# Patient Record
Sex: MALE | Race: WHITE | NOT HISPANIC OR LATINO | Employment: OTHER | URBAN - METROPOLITAN AREA
[De-identification: names, ages, dates, MRNs, and addresses within clinical notes are randomized per-mention and may not be internally consistent; named-entity substitution may affect disease eponyms.]

---

## 2017-06-20 ENCOUNTER — HOSPITAL ENCOUNTER (OUTPATIENT)
Dept: GENERAL RADIOLOGY | Facility: HOSPITAL | Age: 62
Discharge: HOME | End: 2017-06-20
Admitting: INTERNAL MEDICINE

## 2017-06-20 ENCOUNTER — HOSPITAL ENCOUNTER (OUTPATIENT)
Dept: GENERAL RADIOLOGY | Facility: HOSPITAL | Age: 62
Discharge: HOME | End: 2017-06-20

## 2017-06-20 ENCOUNTER — TRANSCRIBE ORDERS (OUTPATIENT)
Dept: GENERAL RADIOLOGY | Facility: HOSPITAL | Age: 62
End: 2017-06-20

## 2017-06-20 DIAGNOSIS — M54.9 BACK PAIN, UNSPECIFIED BACK LOCATION, UNSPECIFIED BACK PAIN LATERALITY, UNSPECIFIED CHRONICITY: ICD-10-CM

## 2017-06-20 DIAGNOSIS — M54.9 BACK PAIN, UNSPECIFIED BACK LOCATION, UNSPECIFIED BACK PAIN LATERALITY, UNSPECIFIED CHRONICITY: Primary | ICD-10-CM

## 2017-06-20 PROCEDURE — 72072 X-RAY EXAM THORAC SPINE 3VWS: CPT

## 2017-06-20 PROCEDURE — 72110 X-RAY EXAM L-2 SPINE 4/>VWS: CPT

## 2017-07-12 RX ORDER — LISINOPRIL 10 MG/1
TABLET ORAL
Qty: 30 TABLET | Refills: 0 | Status: SHIPPED | OUTPATIENT
Start: 2017-07-12 | End: 2017-08-08 | Stop reason: SDUPTHER

## 2017-07-12 RX ORDER — AMLODIPINE BESYLATE 2.5 MG/1
TABLET ORAL
Qty: 30 TABLET | Refills: 0 | Status: SHIPPED | OUTPATIENT
Start: 2017-07-12 | End: 2017-08-08 | Stop reason: SDUPTHER

## 2017-08-08 RX ORDER — LISINOPRIL 10 MG/1
TABLET ORAL
Qty: 30 TABLET | Refills: 0 | Status: SHIPPED | OUTPATIENT
Start: 2017-08-08 | End: 2017-09-05 | Stop reason: SDUPTHER

## 2017-08-08 RX ORDER — AMLODIPINE BESYLATE 2.5 MG/1
TABLET ORAL
Qty: 30 TABLET | Refills: 0 | Status: SHIPPED | OUTPATIENT
Start: 2017-08-08 | End: 2017-09-05 | Stop reason: SDUPTHER

## 2017-09-05 RX ORDER — AMLODIPINE BESYLATE 2.5 MG/1
TABLET ORAL
Qty: 30 TABLET | Refills: 0 | Status: SHIPPED | OUTPATIENT
Start: 2017-09-05 | End: 2017-10-04 | Stop reason: SDUPTHER

## 2017-09-05 RX ORDER — LISINOPRIL 10 MG/1
TABLET ORAL
Qty: 30 TABLET | Refills: 0 | Status: SHIPPED | OUTPATIENT
Start: 2017-09-05 | End: 2017-10-04 | Stop reason: SDUPTHER

## 2017-09-08 RX ORDER — CARVEDILOL 6.25 MG/1
TABLET ORAL
Qty: 60 TABLET | Refills: 0 | Status: SHIPPED | OUTPATIENT
Start: 2017-09-08 | End: 2017-10-10 | Stop reason: SDUPTHER

## 2017-10-02 RX ORDER — CANAGLIFLOZIN AND METFORMIN HYDROCHLORIDE 150; 1000 MG/1; MG/1
TABLET, FILM COATED ORAL
Qty: 60 TABLET | Refills: 1 | Status: SHIPPED | OUTPATIENT
Start: 2017-10-02 | End: 2017-12-04 | Stop reason: SDUPTHER

## 2017-10-02 RX ORDER — ATORVASTATIN CALCIUM 40 MG/1
TABLET, FILM COATED ORAL
Qty: 30 TABLET | Refills: 1 | Status: SHIPPED | OUTPATIENT
Start: 2017-10-02 | End: 2018-04-13 | Stop reason: SDUPTHER

## 2017-10-04 RX ORDER — AMLODIPINE BESYLATE 2.5 MG/1
TABLET ORAL
Qty: 30 TABLET | Refills: 0 | Status: SHIPPED | OUTPATIENT
Start: 2017-10-04 | End: 2017-10-30 | Stop reason: SDUPTHER

## 2017-10-04 RX ORDER — LISINOPRIL 10 MG/1
TABLET ORAL
Qty: 30 TABLET | Refills: 0 | Status: SHIPPED | OUTPATIENT
Start: 2017-10-04 | End: 2017-10-30 | Stop reason: SDUPTHER

## 2017-10-10 RX ORDER — CARVEDILOL 6.25 MG/1
TABLET ORAL
Qty: 60 TABLET | Refills: 0 | Status: SHIPPED | OUTPATIENT
Start: 2017-10-10 | End: 2017-11-14 | Stop reason: SDUPTHER

## 2017-10-30 RX ORDER — LISINOPRIL 10 MG/1
TABLET ORAL
Qty: 30 TABLET | Refills: 0 | Status: SHIPPED | OUTPATIENT
Start: 2017-10-30 | End: 2017-12-04 | Stop reason: SDUPTHER

## 2017-10-30 RX ORDER — AMLODIPINE BESYLATE 2.5 MG/1
TABLET ORAL
Qty: 30 TABLET | Refills: 0 | Status: SHIPPED | OUTPATIENT
Start: 2017-10-30 | End: 2017-12-04 | Stop reason: SDUPTHER

## 2017-11-14 RX ORDER — CARVEDILOL 6.25 MG/1
TABLET ORAL
Qty: 60 TABLET | Refills: 0 | Status: SHIPPED | OUTPATIENT
Start: 2017-11-14 | End: 2017-12-18 | Stop reason: SDUPTHER

## 2017-11-20 ENCOUNTER — OFFICE VISIT (OUTPATIENT)
Dept: FAMILY MEDICINE CLINIC | Facility: CLINIC | Age: 62
End: 2017-11-20

## 2017-11-20 ENCOUNTER — LAB (OUTPATIENT)
Dept: LAB | Facility: HOSPITAL | Age: 62
End: 2017-11-20

## 2017-11-20 VITALS
HEART RATE: 80 BPM | WEIGHT: 221 LBS | OXYGEN SATURATION: 98 % | BODY MASS INDEX: 29.93 KG/M2 | DIASTOLIC BLOOD PRESSURE: 80 MMHG | SYSTOLIC BLOOD PRESSURE: 146 MMHG | HEIGHT: 72 IN

## 2017-11-20 DIAGNOSIS — I10 HYPERTENSION, UNSPECIFIED TYPE: ICD-10-CM

## 2017-11-20 DIAGNOSIS — Z00.00 ANNUAL PHYSICAL EXAM: ICD-10-CM

## 2017-11-20 DIAGNOSIS — R53.83 FATIGUE, UNSPECIFIED TYPE: ICD-10-CM

## 2017-11-20 DIAGNOSIS — G60.9 IDIOPATHIC PERIPHERAL NEUROPATHY: ICD-10-CM

## 2017-11-20 DIAGNOSIS — E66.9 CLASS 1 OBESITY WITHOUT SERIOUS COMORBIDITY IN ADULT, UNSPECIFIED BMI, UNSPECIFIED OBESITY TYPE: ICD-10-CM

## 2017-11-20 DIAGNOSIS — K21.9 GASTROESOPHAGEAL REFLUX DISEASE WITHOUT ESOPHAGITIS: ICD-10-CM

## 2017-11-20 DIAGNOSIS — E78.5 DYSLIPIDEMIA: ICD-10-CM

## 2017-11-20 DIAGNOSIS — I48.92 ATRIAL FLUTTER, UNSPECIFIED TYPE (HCC): ICD-10-CM

## 2017-11-20 DIAGNOSIS — E11.9 TYPE 2 DIABETES MELLITUS WITHOUT COMPLICATION, WITHOUT LONG-TERM CURRENT USE OF INSULIN (HCC): Primary | ICD-10-CM

## 2017-11-20 DIAGNOSIS — E11.9 TYPE 2 DIABETES MELLITUS WITHOUT COMPLICATION, WITHOUT LONG-TERM CURRENT USE OF INSULIN (HCC): ICD-10-CM

## 2017-11-20 DIAGNOSIS — I25.10 CORONARY ARTERY DISEASE INVOLVING NATIVE HEART WITHOUT ANGINA PECTORIS, UNSPECIFIED VESSEL OR LESION TYPE: ICD-10-CM

## 2017-11-20 LAB
ALBUMIN SERPL-MCNC: 4.6 G/DL (ref 3.2–4.8)
ALBUMIN/GLOB SERPL: 1.8 G/DL (ref 1.5–2.5)
ALP SERPL-CCNC: 121 U/L (ref 25–100)
ALT SERPL W P-5'-P-CCNC: 52 U/L (ref 7–40)
ANION GAP SERPL CALCULATED.3IONS-SCNC: 13 MMOL/L (ref 3–11)
ARTICHOKE IGE QN: 31 MG/DL (ref 0–130)
AST SERPL-CCNC: 52 U/L (ref 0–33)
BILIRUB SERPL-MCNC: 0.7 MG/DL (ref 0.3–1.2)
BUN BLD-MCNC: 16 MG/DL (ref 9–23)
BUN/CREAT SERPL: 17.8 (ref 7–25)
CALCIUM SPEC-SCNC: 9.8 MG/DL (ref 8.7–10.4)
CHLORIDE SERPL-SCNC: 105 MMOL/L (ref 99–109)
CHOLEST SERPL-MCNC: 97 MG/DL (ref 0–200)
CO2 SERPL-SCNC: 25 MMOL/L (ref 20–31)
CREAT BLD-MCNC: 0.9 MG/DL (ref 0.6–1.3)
DEPRECATED RDW RBC AUTO: 52.8 FL (ref 37–54)
ERYTHROCYTE [DISTWIDTH] IN BLOOD BY AUTOMATED COUNT: 17.6 % (ref 11.3–14.5)
FERRITIN SERPL-MCNC: 20 NG/ML (ref 22–322)
GFR SERPL CREATININE-BSD FRML MDRD: 85 ML/MIN/1.73
GLOBULIN UR ELPH-MCNC: 2.6 GM/DL
GLUCOSE BLD-MCNC: 211 MG/DL (ref 70–100)
HBA1C MFR BLD: 8.4 %
HCT VFR BLD AUTO: 44.6 % (ref 38.9–50.9)
HDLC SERPL-MCNC: 47 MG/DL (ref 40–60)
HGB BLD-MCNC: 13.4 G/DL (ref 13.1–17.5)
MCH RBC QN AUTO: 25.1 PG (ref 27–31)
MCHC RBC AUTO-ENTMCNC: 30 G/DL (ref 32–36)
MCV RBC AUTO: 83.5 FL (ref 80–99)
PLATELET # BLD AUTO: 187 10*3/MM3 (ref 150–450)
PMV BLD AUTO: 10.3 FL (ref 6–12)
POTASSIUM BLD-SCNC: 4.6 MMOL/L (ref 3.5–5.5)
PROT SERPL-MCNC: 7.2 G/DL (ref 5.7–8.2)
RBC # BLD AUTO: 5.34 10*6/MM3 (ref 4.2–5.76)
SODIUM BLD-SCNC: 143 MMOL/L (ref 132–146)
TRIGL SERPL-MCNC: 133 MG/DL (ref 0–150)
TSH SERPL DL<=0.05 MIU/L-ACNC: 0.76 MIU/ML (ref 0.35–5.35)
WBC NRBC COR # BLD: 7.42 10*3/MM3 (ref 3.5–10.8)

## 2017-11-20 PROCEDURE — 80061 LIPID PANEL: CPT | Performed by: INTERNAL MEDICINE

## 2017-11-20 PROCEDURE — 82728 ASSAY OF FERRITIN: CPT | Performed by: INTERNAL MEDICINE

## 2017-11-20 PROCEDURE — 80053 COMPREHEN METABOLIC PANEL: CPT | Performed by: INTERNAL MEDICINE

## 2017-11-20 PROCEDURE — 85027 COMPLETE CBC AUTOMATED: CPT | Performed by: INTERNAL MEDICINE

## 2017-11-20 PROCEDURE — 83036 HEMOGLOBIN GLYCOSYLATED A1C: CPT | Performed by: INTERNAL MEDICINE

## 2017-11-20 PROCEDURE — 36415 COLL VENOUS BLD VENIPUNCTURE: CPT

## 2017-11-20 PROCEDURE — 84443 ASSAY THYROID STIM HORMONE: CPT | Performed by: INTERNAL MEDICINE

## 2017-11-20 PROCEDURE — 99396 PREV VISIT EST AGE 40-64: CPT | Performed by: INTERNAL MEDICINE

## 2017-11-20 RX ORDER — OMEPRAZOLE 40 MG/1
40 CAPSULE, DELAYED RELEASE ORAL DAILY
COMMUNITY
End: 2018-02-05 | Stop reason: SDUPTHER

## 2017-11-20 RX ORDER — GABAPENTIN 100 MG/1
100 CAPSULE ORAL 3 TIMES DAILY
Qty: 90 CAPSULE | Refills: 1 | Status: SHIPPED | OUTPATIENT
Start: 2017-11-20 | End: 2018-01-17 | Stop reason: SDUPTHER

## 2017-11-20 RX ORDER — FERROUS SULFATE 325(65) MG
325 TABLET ORAL
COMMUNITY

## 2017-11-20 RX ORDER — PREGABALIN 50 MG/1
50 CAPSULE ORAL 3 TIMES DAILY
Qty: 90 CAPSULE | Refills: 0 | Status: SHIPPED | OUTPATIENT
Start: 2017-11-20 | End: 2017-11-20

## 2017-11-20 RX ORDER — ESOMEPRAZOLE MAGNESIUM 40 MG/1
40 CAPSULE, DELAYED RELEASE ORAL
COMMUNITY
End: 2017-11-20

## 2017-11-20 RX ORDER — CARVEDILOL 6.25 MG/1
6.25 TABLET ORAL
COMMUNITY
End: 2017-11-20 | Stop reason: SDUPTHER

## 2017-11-20 RX ORDER — ASPIRIN 325 MG
650 TABLET ORAL
COMMUNITY
End: 2018-11-08

## 2017-11-20 RX ORDER — CLOPIDOGREL BISULFATE 75 MG/1
75 TABLET ORAL
COMMUNITY
End: 2017-11-20

## 2017-11-20 NOTE — PROGRESS NOTES
Reason for Visit   This is a 62 yr old patient who presents for a complete physical exam.  Chief Complaint   Periph neuropathy of hands, right foot - x years - trialed on lyrica in past, not sure if it helped , did not stay on it long.  Never tried gabapentin.    Lesions on penile shaft  Lesions on face  Limb cramp, right leg at night, chronic    History of Present Illness   Here for CPE.    ý   Review of Systems   General: no fever, chills, weight loss, or fatigue  Eyes: no blurry vision or change in vision  ENT: no change in hearing, difficulty hearing, rhinorrhea or nasal discharge  CV: no cp, sob, palpitations; no PND, orthopnea, le edema; no pickard   Respiratory: no cough, wheezes, sob  GI: no change in bowel habits, no n/v/d/c, no melena, no blood in stool   : no frequency, no urgency, no dysuria  MS: no joint pains, back pain, or myalgias  Neuro: no headache, dizziness, or weakness; no new parenthesis.  Endocrine: no polyuria, polydipsia or polyphagia; no heat or cold intolerance  Heme: no easy bruising or bleeding   Skin: no rashes or abnormal moles     Remainder of ROS reviewed in detail and found to be negative and noncontributory.         Patient Active Problem List   Diagnosis   • Atrial flutter   • Coronary artery disease   • HTN (hypertension)   • Dyslipidemia   • Type 2 diabetes mellitus   • Obesity   • GERD (gastroesophageal reflux disease)   • History of gout   • Idiopathic peripheral neuropathy       Past Surgical History:   Procedure Laterality Date   • APPENDECTOMY     • CARDIAC ABLATION     • CORONARY ANGIOPLASTY     • CORONARY STENT PLACEMENT     • ESOPHAGEAL DILATATION     • TONSILLECTOMY         Current Outpatient Prescriptions   Medication Sig Dispense Refill   • amLODIPine (NORVASC) 2.5 MG tablet TAKE ONE TABLET BY MOUTH DAILY 30 tablet 0   • aspirin 325 MG tablet Take 650 mg by mouth.     • atorvastatin (LIPITOR) 40 MG tablet TAKE ONE TABLET BY MOUTH DAILY 30 tablet 1   • carvedilol (COREG)  6.25 MG tablet TAKE ONE TABLET BY MOUTH TWICE A DAY 60 tablet 0   • Evolocumab (REPATHA SURECLICK) 140 MG/ML solution auto-injector Inject  under the skin.     • ferrous sulfate 325 (65 FE) MG tablet Take 325 mg by mouth.     • INVOKAMET 150-1000 MG tablet TAKE ONE TABLET BY MOUTH TWICE A DAY 60 tablet 1   • lisinopril (PRINIVIL,ZESTRIL) 10 MG tablet TAKE ONE TABLET BY MOUTH DAILY 30 tablet 0   • melatonin 3 MG tablet Take 3 mg by mouth.     • omeprazole (priLOSEC) 40 MG capsule Take 40 mg by mouth Daily.     • gabapentin (NEURONTIN) 100 MG capsule Take 1 capsule by mouth 3 (Three) Times a Day. 90 capsule 1     No current facility-administered medications for this visit.        No Known Allergies    Family History   Family History   Problem Relation Age of Onset   • Diabetes Mother    • Heart disease Mother    • Heart disease Father        Social History   Social History     Social History   • Marital status:      Spouse name: N/A   • Number of children: 2   • Years of education: N/A     Occupational History   • insurance businessman      Social History Main Topics   • Smoking status: Never Smoker   • Smokeless tobacco: Never Used   • Alcohol use Yes      Comment: occ   • Drug use: No   • Sexual activity: Not Asked     Other Topics Concern   • None     Social History Narrative    11/17:    Working part time.    .    2 kids.    Lives alone.    Exercise: walks a lot at work 2d/wk    Dental: due    Eye: glasses, within last 2yrs            Immunization History   Administered Date(s) Administered   • Influenza, Unspecified 09/29/2017   • Pneumococcal Polysaccharide 01/01/2014   • Tdap 01/01/2010   • Zoster 01/01/2000       Health Maintenance  Health Maintenance   Topic Date Due   • HEPATITIS C SCREENING  11/20/2017   • DIABETIC FOOT EXAM  11/20/2017   • LIPID PANEL  11/20/2017   • HEMOGLOBIN A1C  11/20/2017   • DIABETIC EYE EXAM  11/20/2017   • URINE MICROALBUMIN  11/20/2017   • TDAP/TD VACCINES (2 - Td)  "01/01/2020   • COLONOSCOPY  01/01/2025   • PNEUMOCOCCAL VACCINE (19-64 MEDIUM RISK)  Completed   • INFLUENZA VACCINE  Completed   • ZOSTER VACCINE  Completed       Blood pressure 146/80, pulse 80, height 72\" (182.9 cm), weight 221 lb (100 kg), SpO2 98 %.  Body mass index is 29.97 kg/(m^2).  Last 3 weights    11/20/17  0750   Weight: 221 lb (100 kg)         Physical Exam   Gen: Pleasant, NAD  HEENT: perrla, eomi, conj without pallor, no icterus/TMs with nl LR/ nares without erythema/mmm, op clear  Neck: supple, no lad or thyromegaly/nodules, no bruit , no jvd  Pulm: cta b/l, no w/r/r  CV: S1, S2 reg, no mrg, no s3/s4  Abd: soft, nt, nd, + bs, no hsm  Rectal: No masses, stool guaiac negative  Ext: no c/c/e, distal pulses intact, skin intact  Neuro: AAO x 3, no focal motor or sensory deficits, normal gait   Skin: penile shaft with raised brown, scaling papule about 1cm diameter c/w seb keratosis  Face with oval scaling lesions , pink, left forehead     A1C 8.4  Lab Results   Component Value Date    HGBA1C 8.4 11/20/2017         Assessment and Plan  62M fo rCPE  Discussion and plan:   General health screening appropriate fo rage reviewed  General nutrition recommendations reviewed  Exercise recommendations reviewed  Healthy weight guidelines reviewed  Testicular self exam reviewed, monthly self exam recommended  Colon cancer screening discussed - colonoscopy recommended.  Had [id number], gagandeep prep - advised 1yr f/u, pt defers until next year  Annual physical exam recommended.  Imm utd  Screening labs ordered    Will wean asa dose to 81mg to take with food    1. Type 2 diabetes mellitus without complication, without long-term current use of insulin   Uncontrolled - advised dietary improvements, pt will do this, reassess 3mo  Advised home bs checks as well., eye exam due   2. Annual physical exam    3. Fatigue, unspecified type    4. Atrial flutter, unspecified type - stable   5. Coronary artery disease involving native " heart without angina pectoris, unspecified vessel or lesion type - stable no angina, cont current meds   6. Hypertension, unspecified type - elevated in office today, will reassess at our 8wk f/u, no med change today   7. Gastroesophageal reflux disease without esophagitis - stalbe on ppi, continue due to h/o stricture dilatations in past, f/u gi as needed   8. Class 1 obesity without serious comorbidity in adult, unspecified BMI, unspecified obesity type    9. Idiopathic peripheral neuropathy - trial gabapentin - reviewed potential risks of med including but not limited to fatigue, mood changs - handout given   10. Dyslipidemia - stable, cont statin     Seb keratosis, penile shaft - pt ressured  Seb dermatitis - face, pt has steorid cream med potency to trial at home - advised no more than 2 wks of tx to face

## 2017-11-20 NOTE — PATIENT INSTRUCTIONS
Preventive Care 40-64 Years, Male  Preventive care refers to lifestyle choices and visits with your health care provider that can promote health and wellness.  WHAT DOES PREVENTIVE CARE INCLUDE?  · A yearly physical exam. This is also called an annual well check.  · Dental exams once or twice a year.  · Routine eye exams. Ask your health care provider how often you should have your eyes checked.  · Personal lifestyle choices, including:    Daily care of your teeth and gums.    Regular physical activity.    Eating a healthy diet.    Avoiding tobacco and drug use.    Limiting alcohol use.    Practicing safe sex.    Taking low-dose aspirin every day starting at age 50.  WHAT HAPPENS DURING AN ANNUAL WELL CHECK?  The services and screenings done by your health care provider during your annual well check will depend on your age, overall health, lifestyle risk factors, and family history of disease.  Counseling  Your health care provider may ask you questions about your:  · Alcohol use.  · Tobacco use.  · Drug use.  · Emotional well-being.  · Home and relationship well-being.  · Sexual activity.  · Eating habits.  · Work and work environment.  Screening  You may have the following tests or measurements:  · Height, weight, and BMI.  · Blood pressure.  · Lipid and cholesterol levels. These may be checked every 5 years, or more frequently if you are over 50 years old.  · Skin check.  · Lung cancer screening. You may have this screening every year starting at age 55 if you have a 30-pack-year history of smoking and currently smoke or have quit within the past 15 years.  · Fecal occult blood test (FOBT) of the stool. You may have this test every year starting at age 50.  · Flexible sigmoidoscopy or colonoscopy. You may have a sigmoidoscopy every 5 years or a colonoscopy every 10 years starting at age 50.  · Prostate cancer screening. Recommendations will vary depending on your family history and other risks.  · Hepatitis C  blood test.  · Hepatitis B blood test.  · Sexually transmitted disease (STD) testing.  · Diabetes screening. This is done by checking your blood sugar (glucose) after you have not eaten for a while (fasting). You may have this done every 1-3 years.  Discuss your test results, treatment options, and if necessary, the need for more tests with your health care provider.  Vaccines  Your health care provider may recommend certain vaccines, such as:  · Influenza vaccine. This is recommended every year.  · Tetanus, diphtheria, and acellular pertussis (Tdap, Td) vaccine. You may need a Td booster every 10 years.  · Varicella vaccine. You may need this if you have not been vaccinated.  · Zoster vaccine. You may need this after age 60.  · Measles, mumps, and rubella (MMR) vaccine. You may need at least one dose of MMR if you were born in 1957 or later. You may also need a second dose.  · Pneumococcal 13-valent conjugate (PCV13) vaccine. You may need this if you have certain conditions and have not been vaccinated.  · Pneumococcal polysaccharide (PPSV23) vaccine. You may need one or two doses if you smoke cigarettes or if you have certain conditions.  · Meningococcal vaccine. You may need this if you have certain conditions.  · Hepatitis A vaccine. You may need this if you have certain conditions or if you travel or work in places where you may be exposed to hepatitis A.  · Hepatitis B vaccine. You may need this if you have certain conditions or if you travel or work in places where you may be exposed to hepatitis B.  · Haemophilus influenzae type b (Hib) vaccine. You may need this if you have certain risk factors.  Talk to your health care provider about which screenings and vaccines you need and how often you need them.     This information is not intended to replace advice given to you by your health care provider. Make sure you discuss any questions you have with your health care provider.     Document Released: 01/13/2017  Document Reviewed: 01/13/2017  "[employer] Interactive Patient Education ©2017 "[employer] Inc.          Gabapentin capsules or tablets  What is this medicine?  GABAPENTIN (GA ba pen tin) is used to control partial seizures in adults with epilepsy. It is also used to treat certain types of nerve pain.  This medicine may be used for other purposes; ask your health care provider or pharmacist if you have questions.  COMMON BRAND NAME(S): Active-PAC with Gabapentin, Gabarone, Neurontin  What should I tell my health care provider before I take this medicine?  They need to know if you have any of these conditions:  -kidney disease  -suicidal thoughts, plans, or attempt; a previous suicide attempt by you or a family member  -an unusual or allergic reaction to gabapentin, other medicines, foods, dyes, or preservatives  -pregnant or trying to get pregnant  -breast-feeding  How should I use this medicine?  Take this medicine by mouth with a glass of water. Follow the directions on the prescription label. You can take it with or without food. If it upsets your stomach, take it with food.Take your medicine at regular intervals. Do not take it more often than directed. Do not stop taking except on your doctor's advice.  If you are directed to break the 600 or 800 mg tablets in half as part of your dose, the extra half tablet should be used for the next dose. If you have not used the extra half tablet within 28 days, it should be thrown away.  A special MedGuide will be given to you by the pharmacist with each prescription and refill. Be sure to read this information carefully each time.  Talk to your pediatrician regarding the use of this medicine in children. Special care may be needed.  Overdosage: If you think you have taken too much of this medicine contact a poison control center or emergency room at once.  NOTE: This medicine is only for you. Do not share this medicine with others.  What if I miss a dose?  If you miss a dose,  take it as soon as you can. If it is almost time for your next dose, take only that dose. Do not take double or extra doses.  What may interact with this medicine?  Do not take this medicine with any of the following medications:  -other gabapentin products  This medicine may also interact with the following medications:  -alcohol  -antacids  -antihistamines for allergy, cough and cold  -certain medicines for anxiety or sleep  -certain medicines for depression or psychotic disturbances  -homatropine; hydrocodone  -naproxen  -narcotic medicines (opiates) for pain  -phenothiazines like chlorpromazine, mesoridazine, prochlorperazine, thioridazine  This list may not describe all possible interactions. Give your health care provider a list of all the medicines, herbs, non-prescription drugs, or dietary supplements you use. Also tell them if you smoke, drink alcohol, or use illegal drugs. Some items may interact with your medicine.  What should I watch for while using this medicine?  Visit your doctor or health care professional for regular checks on your progress. You may want to keep a record at home of how you feel your condition is responding to treatment. You may want to share this information with your doctor or health care professional at each visit. You should contact your doctor or health care professional if your seizures get worse or if you have any new types of seizures. Do not stop taking this medicine or any of your seizure medicines unless instructed by your doctor or health care professional. Stopping your medicine suddenly can increase your seizures or their severity.  Wear a medical identification bracelet or chain if you are taking this medicine for seizures, and carry a card that lists all your medications.  You may get drowsy, dizzy, or have blurred vision. Do not drive, use machinery, or do anything that needs mental alertness until you know how this medicine affects you. To reduce dizzy or fainting  spells, do not sit or stand up quickly, especially if you are an older patient. Alcohol can increase drowsiness and dizziness. Avoid alcoholic drinks.  Your mouth may get dry. Chewing sugarless gum or sucking hard candy, and drinking plenty of water will help.  The use of this medicine may increase the chance of suicidal thoughts or actions. Pay special attention to how you are responding while on this medicine. Any worsening of mood, or thoughts of suicide or dying should be reported to your health care professional right away.  Women who become pregnant while using this medicine may enroll in the [place] Antiepileptic Drug Pregnancy Registry by calling 1-180.252.3197. This registry collects information about the safety of antiepileptic drug use during pregnancy.  What side effects may I notice from receiving this medicine?  Side effects that you should report to your doctor or health care professional as soon as possible:  -allergic reactions like skin rash, itching or hives, swelling of the face, lips, or tongue  -worsening of mood, thoughts or actions of suicide or dying  Side effects that usually do not require medical attention (report to your doctor or health care professional if they continue or are bothersome):  -constipation  -difficulty walking or controlling muscle movements  -dizziness  -nausea  -slurred speech  -tiredness  -tremors  -weight gain  This list may not describe all possible side effects. Call your doctor for medical advice about side effects. You may report side effects to FDA at 2-544-FDA-9687.  Where should I keep my medicine?  Keep out of reach of children.  This medicine may cause accidental overdose and death if it taken by other adults, children, or pets. Mix any unused medicine with a substance like cat litter or coffee grounds. Then throw the medicine away in a sealed container like a sealed bag or a coffee can with a lid. Do not use the medicine after the expiration  date.  Store at room temperature between 15 and 30 degrees C (59 and 86 degrees F).  NOTE: This sheet is a summary. It may not cover all possible information. If you have questions about this medicine, talk to your doctor, pharmacist, or health care provider.     © 2017, [employer]. (2015-02-13 15:26:50)

## 2017-11-28 DIAGNOSIS — R79.89 ELEVATED LFTS: Primary | ICD-10-CM

## 2017-11-28 PROBLEM — E61.1 IRON DEFICIENCY: Status: ACTIVE | Noted: 2017-11-28

## 2017-11-28 PROBLEM — G43.909 MIGRAINE HEADACHE: Status: ACTIVE | Noted: 2017-11-28

## 2017-11-28 PROBLEM — F32.A DEPRESSION: Status: ACTIVE | Noted: 2017-11-28

## 2017-11-28 PROBLEM — S39.012A LOW BACK STRAIN: Status: ACTIVE | Noted: 2017-11-28

## 2017-11-28 PROBLEM — D64.9 ANEMIA: Status: ACTIVE | Noted: 2017-11-28

## 2017-11-28 PROBLEM — R74.8 ELEVATED LIVER ENZYMES: Status: ACTIVE | Noted: 2017-11-28

## 2017-11-28 PROBLEM — E29.1 HYPOGONADISM IN MALE: Status: ACTIVE | Noted: 2017-11-28

## 2017-11-28 PROBLEM — K22.2 ESOPHAGEAL STRICTURE: Status: ACTIVE | Noted: 2017-11-28

## 2017-11-28 PROBLEM — Z86.010 HX OF ADENOMATOUS COLONIC POLYPS: Status: ACTIVE | Noted: 2017-11-28

## 2017-12-01 ENCOUNTER — TELEPHONE (OUTPATIENT)
Dept: FAMILY MEDICINE CLINIC | Facility: CLINIC | Age: 62
End: 2017-12-01

## 2017-12-01 NOTE — TELEPHONE ENCOUNTER
----- Message from Bindu Pavon DO sent at 12/1/2017  1:43 PM EST -----  I had sent you result note few days prior - did you reach him?  See below.        Please inform patient that his labs show:  1. Mild elevation in alt/ ast - liver function studies - I looked through records and has been on and off since 2011 - I do not see further workup - would recommend u/s of liver and a few further labs (hepatitis screen), has been stable so not too worrisome  2.  His ferritin is mildly low - where it has been before on review of labs - recommend ferrous sulfate 325mg once daily.  3. Can you ask him regardings meds: plavix - when did he stop this?  Was he on ranexa earlier this year?  If so, why stopped?

## 2017-12-04 ENCOUNTER — TELEPHONE (OUTPATIENT)
Dept: FAMILY MEDICINE CLINIC | Facility: CLINIC | Age: 62
End: 2017-12-04

## 2017-12-04 DIAGNOSIS — R74.8 ELEVATED LIVER ENZYMES: Primary | ICD-10-CM

## 2017-12-04 RX ORDER — LISINOPRIL 10 MG/1
10 TABLET ORAL DAILY
Qty: 30 TABLET | Refills: 2 | Status: SHIPPED | OUTPATIENT
Start: 2017-12-04 | End: 2018-03-10 | Stop reason: SDUPTHER

## 2017-12-04 RX ORDER — CLOPIDOGREL BISULFATE 75 MG/1
75 TABLET ORAL DAILY
Qty: 30 TABLET | Refills: 3 | Status: SHIPPED | OUTPATIENT
Start: 2017-12-04 | End: 2018-06-16 | Stop reason: SDUPTHER

## 2017-12-04 RX ORDER — AMLODIPINE BESYLATE 2.5 MG/1
2.5 TABLET ORAL DAILY
Qty: 30 TABLET | Refills: 2 | Status: SHIPPED | OUTPATIENT
Start: 2017-12-04 | End: 2018-03-10 | Stop reason: SDUPTHER

## 2017-12-04 NOTE — TELEPHONE ENCOUNTER
E-prescribed Amlodipine 2.5 mg 1 po qd # 30 R 2  Lisinopril 10 mg 1 po qd # 30 R 2  Invokamet 150-1000 mg 1 po bid # 60 R 2

## 2017-12-04 NOTE — TELEPHONE ENCOUNTER
Returned Call about labs and to inform Dr Pavon he is Taking Plavix and he will do U/S of liver after the first of  The year

## 2017-12-11 ENCOUNTER — HOSPITAL ENCOUNTER (OUTPATIENT)
Dept: ULTRASOUND IMAGING | Facility: HOSPITAL | Age: 62
Discharge: HOME | End: 2017-12-11
Admitting: INTERNAL MEDICINE

## 2017-12-11 DIAGNOSIS — R74.8 ELEVATED LIVER ENZYMES: ICD-10-CM

## 2017-12-11 PROCEDURE — 76700 US EXAM ABDOM COMPLETE: CPT

## 2017-12-18 RX ORDER — CARVEDILOL 6.25 MG/1
6.25 TABLET ORAL 2 TIMES DAILY WITH MEALS
Qty: 60 TABLET | Refills: 1 | Status: SHIPPED | OUTPATIENT
Start: 2017-12-18 | End: 2018-02-17 | Stop reason: SDUPTHER

## 2018-01-02 ENCOUNTER — TELEPHONE (OUTPATIENT)
Dept: FAMILY MEDICINE CLINIC | Facility: CLINIC | Age: 63
End: 2018-01-02

## 2018-01-02 NOTE — TELEPHONE ENCOUNTER
IS TAKING INVOKAMET BUT HIS INSURANCE WILL NOT COVER. NEEDS SOMETHING ELSE CALLED IN COMPARABLE.     PLEASE [facility] [phone]    WILL BE FAXING OVER SOME SUGGESTIONS THE PHARMACY GAVE.    [facility]

## 2018-01-03 NOTE — TELEPHONE ENCOUNTER
Ordered synjardy 5/1000 - to take 1 tablet 2x/day before meals.  Make sure he has f/u with me in next 2-3mo (if not sooner)  - would like to see him q3mo for med adjustment.    Bindu Patel

## 2018-01-04 ENCOUNTER — PRIOR AUTHORIZATION (OUTPATIENT)
Dept: FAMILY MEDICINE CLINIC | Facility: CLINIC | Age: 63
End: 2018-01-04

## 2018-01-17 RX ORDER — GABAPENTIN 100 MG/1
CAPSULE ORAL
Qty: 90 CAPSULE | Refills: 2 | Status: SHIPPED | OUTPATIENT
Start: 2018-01-17 | End: 2018-04-28 | Stop reason: SDUPTHER

## 2018-02-05 RX ORDER — OMEPRAZOLE 40 MG/1
40 CAPSULE, DELAYED RELEASE ORAL DAILY
Qty: 30 CAPSULE | Refills: 2 | Status: SHIPPED | OUTPATIENT
Start: 2018-02-05 | End: 2018-05-13 | Stop reason: SDUPTHER

## 2018-02-19 RX ORDER — CARVEDILOL 6.25 MG/1
TABLET ORAL
Qty: 60 TABLET | Refills: 5 | Status: SHIPPED | OUTPATIENT
Start: 2018-02-19 | End: 2018-08-21 | Stop reason: SDUPTHER

## 2018-03-07 ENCOUNTER — TELEPHONE (OUTPATIENT)
Dept: FAMILY MEDICINE CLINIC | Facility: CLINIC | Age: 63
End: 2018-03-07

## 2018-03-07 NOTE — TELEPHONE ENCOUNTER
PATIENT NEEDS SOMETHING DIFFERENT FOR HIS DIABETES. SAID THE EMPAGLIFLOZIN-METFORMIN IS TO EXPENSIVE. DID NOT PICK IT UP SO HAS NOT BEEN ON ANYTHING SINCE JAN.    PLEASE CALL TO [facility] [phone]    JUDY ON [facility]    ALSO HAS A SPOT (BUMP) ON HIS PENIS AND DOES NOT KNOW IF IT SHOULD BE REMOVED OR NOT. DR HANDY HAD NOT MENTIONED WHAT HE SHOULD DO ABOUT IT.    HAVING RIGHT SHOULDER PROBLEMS WHEN RASING ARM OVER HIS HEAD AS WELL.     SPOTS ON FOREHEAD AND DR HANDY DID NOT SAY ANYTHING. VERY CONCERNED ABOUT THEM AS WELL. WANTS A CALL BEFORE HE MAKES AN APPT.    WE MADE AN APPT ON 4/5/18

## 2018-03-08 NOTE — TELEPHONE ENCOUNTER
LM at #[phone] for pt to call back.     Last seen 11/20/17.     Has he been checking his sugar?     Invokamet was originally prescribed but wasn't covered. Ins preferred Synjardy or Xigduo. Therefore was changed to Synjardy. See 1/2/18 telephone encounter. He should have informed us sooner that he was unable to  script. Very concerning that he has not had diabetic meds for 9 weeks.     He needs to be seen.

## 2018-03-12 RX ORDER — AMLODIPINE BESYLATE 2.5 MG/1
TABLET ORAL
Qty: 30 TABLET | Refills: 0 | Status: SHIPPED | OUTPATIENT
Start: 2018-03-12 | End: 2018-04-08 | Stop reason: SDUPTHER

## 2018-03-12 RX ORDER — LISINOPRIL 10 MG/1
TABLET ORAL
Qty: 30 TABLET | Refills: 0 | Status: SHIPPED | OUTPATIENT
Start: 2018-03-12 | End: 2018-04-08 | Stop reason: SDUPTHER

## 2018-03-14 RX ORDER — METFORMIN HYDROCHLORIDE 500 MG/1
500 TABLET, EXTENDED RELEASE ORAL 2 TIMES DAILY WITH MEALS
Qty: 180 TABLET | Refills: 3 | Status: SHIPPED | OUTPATIENT
Start: 2018-03-14 | End: 2018-11-08 | Stop reason: DRUGHIGH

## 2018-04-05 ENCOUNTER — TELEPHONE (OUTPATIENT)
Dept: FAMILY MEDICINE CLINIC | Facility: CLINIC | Age: 63
End: 2018-04-05

## 2018-04-05 ENCOUNTER — OFFICE VISIT (OUTPATIENT)
Dept: FAMILY MEDICINE CLINIC | Facility: CLINIC | Age: 63
End: 2018-04-05

## 2018-04-05 VITALS
DIASTOLIC BLOOD PRESSURE: 78 MMHG | HEIGHT: 72 IN | TEMPERATURE: 98 F | WEIGHT: 221 LBS | SYSTOLIC BLOOD PRESSURE: 136 MMHG | BODY MASS INDEX: 29.93 KG/M2

## 2018-04-05 DIAGNOSIS — M75.81 TENDINITIS OF RIGHT ROTATOR CUFF: ICD-10-CM

## 2018-04-05 DIAGNOSIS — M54.5 CHRONIC LOW BACK PAIN, UNSPECIFIED BACK PAIN LATERALITY, WITH SCIATICA PRESENCE UNSPECIFIED: Primary | ICD-10-CM

## 2018-04-05 DIAGNOSIS — G62.9 PERIPHERAL POLYNEUROPATHY: ICD-10-CM

## 2018-04-05 DIAGNOSIS — G89.29 CHRONIC LOW BACK PAIN, UNSPECIFIED BACK PAIN LATERALITY, WITH SCIATICA PRESENCE UNSPECIFIED: Primary | ICD-10-CM

## 2018-04-05 DIAGNOSIS — M25.551 LATERAL PAIN OF RIGHT HIP: ICD-10-CM

## 2018-04-05 DIAGNOSIS — L21.9 SEBORRHEIC DERMATITIS: ICD-10-CM

## 2018-04-05 PROCEDURE — 99214 OFFICE O/P EST MOD 30 MIN: CPT | Performed by: INTERNAL MEDICINE

## 2018-04-05 RX ORDER — KETOCONAZOLE 20 MG/ML
SHAMPOO TOPICAL
Qty: 200 ML | Refills: 0 | OUTPATIENT
Start: 2018-04-05 | End: 2019-09-09

## 2018-04-05 RX ORDER — DIAPER,BRIEF,INFANT-TODD,DISP
EACH MISCELLANEOUS
Qty: 56 G | Refills: 0 | Status: SHIPPED | OUTPATIENT
Start: 2018-04-05 | End: 2019-09-09

## 2018-04-05 RX ORDER — PREGABALIN 100 MG/1
100 CAPSULE ORAL 3 TIMES DAILY
Qty: 90 CAPSULE | Refills: 2 | OUTPATIENT
Start: 2018-04-05 | End: 2018-11-08

## 2018-04-05 NOTE — PROGRESS NOTES
62M here for followin.   Spots on face x years, seen by derm in past - not sure dx, may have used 5-FU.  Tender spots forehead and cheeks, ongoing, not progressive    2.  Mid back pain - aches - sometimes all day, worse with movement,  Going on several months - no trauma to area, was sudden onset. Worse at onset, but persistent -   Using acetominophen daily with some effect    3.  Right shoulder pain x months - fall a couple of years ago when he hurt it , ongoing pain , issue - has progressed, worse with movement    4.  Right hip pain, lateral - weeks - notes with trying to cross leg, twisting movement - no injurry.    5.  Peirph neuropathy - on lyrica 50mg tid, often getting just bid dosing.  On med about a year.  No progression.  Not sure helping.      Also c/o fatigue.    Review of Systems   General: no  fever/chills, unintentional wt loss, malaise, night sweats  Skin: no rash, no hives, no lesions,   Eyes: no visual disturbance   Heme: no brusing, no bleeding  ENT: no hearing loss, no dizziness, no nosebleed, no hoarseness  Endocrine: , no polyuria, polyphagia, polydipsia, no heat or cold intolerance  GI: no nausea, no vomiting, no diarrhea, no constipation, no bleeding, no pain  : no dysuria, no urinary frequency,, no hematuria, or incontinence  Extremities: no edema, , no claudication  Cardiac: no chest pain, no palpitations, no orthopnea, no PND  Respiratory: no cough, no sputum, no wheezing, no sob , no hemoptysis  Neuro: no headache, no seizure,, no paresthesias or weakness  Psych: + depression      Patient Active Problem List    Diagnosis   • Depression [F32.9]     Overview Note:     H/o sertraline     • Anemia [D64.9]   • Migraine headache [G43.909]   • Low back strain [S39.012A]   • Hypogonadism in male [E29.1]   • Esophageal stricture [K22.2]   • Hx of adenomatous colonic polyps [Z86.010]     Overview Note:     Colonoscopy 12/15, poor prep, recommends 1yr f/u - pt defers a year     • Iron  deficiency [E61.1]     Overview Note:     Low ferritin noted 9/16 labs, 3/15 labs - ferrous sulfate recommended  Colonoscopy 12/15 limited, normal, stool gu 2015, 2014 neg     • Elevated liver enzymes [R74.8]     Overview Note:     11/17 - mild elevation, on review of labs over past 3yrs, normal to slightly elevated        • Idiopathic peripheral neuropathy [G60.9]     Overview Note:     11/17: will trial lyrica     • Atrial flutter [I48.92]     Overview Note:     a. Unknown onset, noted incidentally at time of office visit, March 2014.   b. CHADS score of 2.   c. Status post ASYA guided cardioversion; 03/11/2014.  d. -taken off anticoagulants since told free of arrhythmias     • Coronary artery disease [I25.10]     Overview Note:     e. History of non-ST MI, March 2008.  f. Status post proximal LAD stent, data deficient.    g. Taxus LALITO to the OM-1 and OM-3 (04/04/2008).  no current angina    Jan 2017 -chest pain - cardiac cath - obtuse marginal one in -stent restenosis - re-stented, severe diffuse disease involving small distal vessels of all epicardial arteries, poor distal targets, optimal medical therapy recommended - asa 81mg, Brilinta 90mg bid x 1yr  3/17 - cardiology note - asa decrease to 81mg, continue on plavix, start Ranexa  11/17: pt on asa, plavix     • HTN (hypertension) [I10]   • Dyslipidemia [E78.5]   • Type 2 diabetes mellitus [E11.9]     Overview Note:     11/17:  2008 , periph neuropathy, cad - invokamet , not checking bs at home -      • Obesity [E66.9]   • GERD (gastroesophageal reflux disease) [K21.9]   • History of gout [Z87.39]     Past Surgical History:   Procedure Laterality Date   • APPENDECTOMY     • CARDIAC ABLATION     • CORONARY ANGIOPLASTY     • CORONARY STENT PLACEMENT     • ESOPHAGEAL DILATATION     • TONSILLECTOMY       Current Outpatient Prescriptions   Medication Sig Dispense Refill   • amLODIPine (NORVASC) 2.5 MG tablet TAKE ONE TABLET BY MOUTH DAILY 30 tablet 0   • aspirin 325  MG tablet Take 650 mg by mouth.     • atorvastatin (LIPITOR) 40 MG tablet TAKE ONE TABLET BY MOUTH DAILY 30 tablet 1   • carvedilol (COREG) 6.25 MG tablet TAKE ONE TABLET BY MOUTH TWICE A DAY WITH MEALS 60 tablet 5   • clopidogrel (PLAVIX) 75 MG tablet Take 1 tablet by mouth Daily. 30 tablet 3   • Empagliflozin-Metformin HCl (SYNJARDY) 5-1000 MG tablet Take 1 tablet by mouth 2 (Two) Times a Day Before Meals. 60 tablet 5   • Evolocumab (REPATHA SURECLICK) 140 MG/ML solution auto-injector Inject  under the skin.     • ferrous sulfate 325 (65 FE) MG tablet Take 325 mg by mouth.     • gabapentin (NEURONTIN) 100 MG capsule TAKE ONE CAPSULE BY MOUTH THREE TIMES A DAY 90 capsule 2   • lisinopril (PRINIVIL,ZESTRIL) 10 MG tablet TAKE ONE TABLET BY MOUTH DAILY 30 tablet 0   • melatonin 3 MG tablet Take 3 mg by mouth.     • metFORMIN ER (GLUCOPHAGE XR) 500 MG 24 hr tablet Take 1 tablet by mouth 2 (Two) Times a Day With Meals. 180 tablet 3   • omeprazole (priLOSEC) 40 MG capsule Take 1 capsule by mouth Daily. 30 capsule 2   • hydrocortisone 0.5 % cream Apply to affected area 2x/day for max of 2 weeks 56 g 0   • Ketoconazole 1 % shampoo Lather and apply to face, leave for 5min , then rinse daily in shower 200 mL 0   • pregabalin (LYRICA) 100 MG capsule Take 1 capsule by mouth 3 (Three) Times a Day. 90 capsule 2     No current facility-administered medications for this visit.      No Known Allergies    Social History     Social History   • Marital status:    • Number of children: 2     Occupational History   • insurance businessman      Social History Main Topics   • Smoking status: Never Smoker   • Smokeless tobacco: Never Used   • Alcohol use Yes      Comment: occ   • Drug use: No     Other Topics Concern   • Not on file     Social History Narrative    11/17:    Working part time.    .    2 kids.    Lives alone.    Exercise: walks a lot at work 2d/wk    Dental: due    Eye: glasses, within last 2yrs     Family  "History   Problem Relation Age of Onset   • Diabetes Mother    • Heart disease Mother    • Heart disease Father        /78   Temp 98 °F (36.7 °C)   Ht 182.9 cm (72\")   Wt 100 kg (221 lb)   BMI 29.97 kg/m²   Gen: well appearing in nad, no resp effort, depressed affect  Eyes: conjunctiva clear, perrl, eomi  ENT: mmm, no thyromegaly, no lymphadenopathy  CV: s1, s2 reg no m/r/g, no bruits, no jvd  No peripheral edema, pedal pulses intact  Resp:  clear b/l no w/r/r  GI:  soft nt/nd  Skin: no clubbing or cyanosis  MS:   Right shoulder with full rom, tender to palp at rotator cuff insertion ant and post prox humerus,  Strength intact  Right hip  - full rom, tender with FABERE test laterally, no groin tenderness, strength intact  Back:  Lumbar spine with nl lordosis, tspine - kyphotic, mild - paraspinals with hypertonicity, no tenderness to spine to palpation    A/P    1. Chronic low back pain, unspecified back pain laterality, with sciatica presence unspecified    2. Tendinitis of right rotator cuff    3. Lateral pain of right hip    4. Peripheral polyneuropathy    5. Seborrheic dermatitis        Discussed PT, pt states too expensive - advised home exercises for shoulder, back - handouts given, ice as needed    Increase lyrica to 100mg tid, f/u 6wks on periph neuropathy    Trial topicals ordered to face, consider derm f/u if not improving.      F/u as needed for worsening sx or med concerns    "

## 2018-04-05 NOTE — TELEPHONE ENCOUNTER
ONLY HAVE 2 PERCENT IN SYSTEM AND PRESCRIPTION IS FOR ONE PERCENT PLEASE ADVISE IF CAN USE THE 2 PERCENT    [facility] [phone]

## 2018-04-05 NOTE — PATIENT INSTRUCTIONS
For face:  Seborrheic dermaitis: use the hydrocortisone cream 2x/day for 2 wks to affected areas.  Use the ketoconazole shampoo on face in shower daily x 5min then rinse.  See dermatology if not improved on this regimen.    For Mid and low back - trial exercises, ice down after exercises  For shoulder - trial exercises, ice down after exercises.  We discussed physical therapy recommendation as well.      Will titrate lyrica dose up to 100mg 2-3x/day

## 2018-04-05 NOTE — TELEPHONE ENCOUNTER
Per Dr Pavon, called pharmacy spoke to Bill and advised could fill and use the 2% shampoo, she advised that she would go ahead and change it and fill it for the patient

## 2018-04-09 RX ORDER — AMLODIPINE BESYLATE 2.5 MG/1
TABLET ORAL
Qty: 30 TABLET | Refills: 5 | Status: SHIPPED | OUTPATIENT
Start: 2018-04-09 | End: 2018-10-14 | Stop reason: SDUPTHER

## 2018-04-09 RX ORDER — LISINOPRIL 10 MG/1
TABLET ORAL
Qty: 30 TABLET | Refills: 5 | Status: SHIPPED | OUTPATIENT
Start: 2018-04-09 | End: 2018-10-14 | Stop reason: SDUPTHER

## 2018-04-13 RX ORDER — ATORVASTATIN CALCIUM 40 MG/1
40 TABLET, FILM COATED ORAL DAILY
Qty: 30 TABLET | Refills: 2 | Status: SHIPPED | OUTPATIENT
Start: 2018-04-13 | End: 2018-07-18 | Stop reason: SDUPTHER

## 2018-04-30 RX ORDER — GABAPENTIN 100 MG/1
CAPSULE ORAL
Qty: 90 CAPSULE | Refills: 2 | OUTPATIENT
Start: 2018-04-30 | End: 2018-10-25 | Stop reason: SDUPTHER

## 2018-04-30 NOTE — TELEPHONE ENCOUNTER
Last seen 4/5/18. Last written 1/17/18 -  1 po tid #90 with 2RF.     Does not have any future scheduled appts.

## 2018-05-14 RX ORDER — OMEPRAZOLE 40 MG/1
CAPSULE, DELAYED RELEASE ORAL
Qty: 30 CAPSULE | Refills: 5 | Status: SHIPPED | OUTPATIENT
Start: 2018-05-14 | End: 2018-11-12 | Stop reason: SDUPTHER

## 2018-06-18 RX ORDER — CLOPIDOGREL BISULFATE 75 MG/1
TABLET ORAL
Qty: 30 TABLET | Refills: 2 | Status: SHIPPED | OUTPATIENT
Start: 2018-06-18 | End: 2018-09-16 | Stop reason: SDUPTHER

## 2018-07-18 RX ORDER — ATORVASTATIN CALCIUM 40 MG/1
TABLET, FILM COATED ORAL
Qty: 30 TABLET | Refills: 1 | Status: SHIPPED | OUTPATIENT
Start: 2018-07-18 | End: 2018-09-16 | Stop reason: SDUPTHER

## 2018-07-30 RX ORDER — GABAPENTIN 100 MG/1
CAPSULE ORAL
Qty: 90 CAPSULE | Refills: 1 | OUTPATIENT
Start: 2018-07-30

## 2018-08-02 ENCOUNTER — TELEPHONE (OUTPATIENT)
Dept: FAMILY MEDICINE CLINIC | Facility: CLINIC | Age: 63
End: 2018-08-02

## 2018-08-02 NOTE — TELEPHONE ENCOUNTER
Called and informed pt of needing an appt and having to be seen every 3 months for controlled medications. Pt stated this was ridiculous and wanted to know why I called his home number and not his cell. I informed pt I tried his cell phone first and it only rang a couple of times and then cut off, no vm picked up or anything. Pt apologized. Pt wanted to know why he had to come in and why the visit could not be done over the phone. I informed pt it was policy and in order to receive the rx he has to be seen in office every 3 months or we would not continue to write it. Pt verbalized understanding and ended the call.

## 2018-08-22 RX ORDER — CARVEDILOL 6.25 MG/1
TABLET ORAL
Qty: 60 TABLET | Refills: 4 | Status: SHIPPED | OUTPATIENT
Start: 2018-08-22 | End: 2019-02-02 | Stop reason: SDUPTHER

## 2018-09-17 RX ORDER — ATORVASTATIN CALCIUM 40 MG/1
TABLET, FILM COATED ORAL
Qty: 30 TABLET | Refills: 5 | Status: SHIPPED | OUTPATIENT
Start: 2018-09-17 | End: 2019-03-21 | Stop reason: SDUPTHER

## 2018-09-17 RX ORDER — CLOPIDOGREL BISULFATE 75 MG/1
TABLET ORAL
Qty: 30 TABLET | Refills: 5 | Status: SHIPPED | OUTPATIENT
Start: 2018-09-17 | End: 2019-03-18 | Stop reason: SDUPTHER

## 2018-10-15 RX ORDER — LISINOPRIL 10 MG/1
TABLET ORAL
Qty: 30 TABLET | Refills: 4 | Status: SHIPPED | OUTPATIENT
Start: 2018-10-15 | End: 2019-03-18 | Stop reason: SDUPTHER

## 2018-10-15 RX ORDER — AMLODIPINE BESYLATE 2.5 MG/1
TABLET ORAL
Qty: 30 TABLET | Refills: 4 | Status: SHIPPED | OUTPATIENT
Start: 2018-10-15 | End: 2019-03-18 | Stop reason: SDUPTHER

## 2018-10-25 RX ORDER — GABAPENTIN 100 MG/1
100 CAPSULE ORAL 3 TIMES DAILY
Qty: 42 CAPSULE | Refills: 0 | Status: SHIPPED | OUTPATIENT
Start: 2018-10-25 | End: 2018-11-04 | Stop reason: SDUPTHER

## 2018-10-25 NOTE — TELEPHONE ENCOUNTER
GABAPENTIN 100 MG, TAKE 1 TAB 3 X DAY, CAN WE CALL IN ENOUGH TO LAST TILL HIS APPT ON 11/7/18?    [address] IN [place]

## 2018-10-25 NOTE — TELEPHONE ENCOUNTER
Last fill: 4/30/18   Last office visit: 4/5/18  Next office visit: 11/8/18  Last Isaiah: Will place in your box for review   Controlled substance contract on file? need updated  Last UDS: need updated

## 2018-11-05 RX ORDER — GABAPENTIN 100 MG/1
CAPSULE ORAL
Qty: 42 CAPSULE | Refills: 0 | Status: SHIPPED | OUTPATIENT
Start: 2018-11-05 | End: 2018-11-08

## 2018-11-05 NOTE — TELEPHONE ENCOUNTER
Last fill:10/25/2018  Last office visit:04/05/218  Next office visit:11/08/2018  Last Isaiah:none scanned  Controlled substance contract on file?none scanned  Last UDS:None scanned      Please advise to wait on 11/08 appointment?

## 2018-11-08 ENCOUNTER — LAB (OUTPATIENT)
Dept: LAB | Facility: HOSPITAL | Age: 63
End: 2018-11-08

## 2018-11-08 ENCOUNTER — OFFICE VISIT (OUTPATIENT)
Dept: FAMILY MEDICINE CLINIC | Facility: CLINIC | Age: 63
End: 2018-11-08

## 2018-11-08 VITALS
DIASTOLIC BLOOD PRESSURE: 64 MMHG | OXYGEN SATURATION: 98 % | HEIGHT: 72 IN | SYSTOLIC BLOOD PRESSURE: 122 MMHG | WEIGHT: 217 LBS | HEART RATE: 85 BPM | BODY MASS INDEX: 29.39 KG/M2

## 2018-11-08 DIAGNOSIS — R53.83 FATIGUE, UNSPECIFIED TYPE: ICD-10-CM

## 2018-11-08 DIAGNOSIS — G60.9 IDIOPATHIC PERIPHERAL NEUROPATHY: ICD-10-CM

## 2018-11-08 DIAGNOSIS — E61.1 IRON DEFICIENCY: ICD-10-CM

## 2018-11-08 DIAGNOSIS — R74.8 ELEVATED LIVER ENZYMES: ICD-10-CM

## 2018-11-08 DIAGNOSIS — I25.10 CORONARY ARTERY DISEASE INVOLVING NATIVE HEART WITHOUT ANGINA PECTORIS, UNSPECIFIED VESSEL OR LESION TYPE: ICD-10-CM

## 2018-11-08 DIAGNOSIS — E11.42 TYPE 2 DIABETES MELLITUS WITH DIABETIC POLYNEUROPATHY, WITHOUT LONG-TERM CURRENT USE OF INSULIN (HCC): ICD-10-CM

## 2018-11-08 DIAGNOSIS — I10 HYPERTENSION, UNSPECIFIED TYPE: ICD-10-CM

## 2018-11-08 DIAGNOSIS — I25.10 CORONARY ARTERY DISEASE INVOLVING NATIVE HEART WITHOUT ANGINA PECTORIS, UNSPECIFIED VESSEL OR LESION TYPE: Primary | ICD-10-CM

## 2018-11-08 DIAGNOSIS — R74.8 ELEVATED LIVER ENZYMES: Primary | ICD-10-CM

## 2018-11-08 LAB
ALBUMIN SERPL-MCNC: 4.4 G/DL (ref 3.2–4.8)
ALBUMIN/GLOB SERPL: 2 G/DL (ref 1.5–2.5)
ALP SERPL-CCNC: 218 U/L (ref 25–100)
ALT SERPL W P-5'-P-CCNC: 75 U/L (ref 7–40)
ANION GAP SERPL CALCULATED.3IONS-SCNC: 10 MMOL/L (ref 3–11)
AST SERPL-CCNC: 74 U/L (ref 0–33)
BASOPHILS # BLD AUTO: 0.04 10*3/MM3 (ref 0–0.2)
BASOPHILS NFR BLD AUTO: 0.6 % (ref 0–1)
BILIRUB SERPL-MCNC: 0.9 MG/DL (ref 0.3–1.2)
BUN BLD-MCNC: 16 MG/DL (ref 9–23)
BUN/CREAT SERPL: 16.2 (ref 7–25)
CALCIUM SPEC-SCNC: 8.8 MG/DL (ref 8.7–10.4)
CHLORIDE SERPL-SCNC: 100 MMOL/L (ref 99–109)
CO2 SERPL-SCNC: 23 MMOL/L (ref 20–31)
CREAT BLD-MCNC: 0.99 MG/DL (ref 0.6–1.3)
DEPRECATED RDW RBC AUTO: 41.8 FL (ref 37–54)
EOSINOPHIL # BLD AUTO: 0.19 10*3/MM3 (ref 0–0.3)
EOSINOPHIL NFR BLD AUTO: 2.7 % (ref 0–3)
ERYTHROCYTE [DISTWIDTH] IN BLOOD BY AUTOMATED COUNT: 12.5 % (ref 11.3–14.5)
GFR SERPL CREATININE-BSD FRML MDRD: 76 ML/MIN/1.73
GLOBULIN UR ELPH-MCNC: 2.2 GM/DL
GLUCOSE BLD-MCNC: 375 MG/DL (ref 70–100)
HBA1C MFR BLD: 12.8 % (ref 4.8–5.6)
HCT VFR BLD AUTO: 44.2 % (ref 38.9–50.9)
HGB BLD-MCNC: 15.1 G/DL (ref 13.1–17.5)
IMM GRANULOCYTES # BLD: 0.01 10*3/MM3 (ref 0–0.03)
IMM GRANULOCYTES NFR BLD: 0.1 % (ref 0–0.6)
LYMPHOCYTES # BLD AUTO: 1.78 10*3/MM3 (ref 0.6–4.8)
LYMPHOCYTES NFR BLD AUTO: 25.6 % (ref 24–44)
MCH RBC QN AUTO: 31.3 PG (ref 27–31)
MCHC RBC AUTO-ENTMCNC: 34.2 G/DL (ref 32–36)
MCV RBC AUTO: 91.7 FL (ref 80–99)
MONOCYTES # BLD AUTO: 0.39 10*3/MM3 (ref 0–1)
MONOCYTES NFR BLD AUTO: 5.6 % (ref 0–12)
NEUTROPHILS # BLD AUTO: 4.56 10*3/MM3 (ref 1.5–8.3)
NEUTROPHILS NFR BLD AUTO: 65.5 % (ref 41–71)
PLATELET # BLD AUTO: 163 10*3/MM3 (ref 150–450)
PMV BLD AUTO: 11.1 FL (ref 6–12)
POTASSIUM BLD-SCNC: 4.3 MMOL/L (ref 3.5–5.5)
PROT SERPL-MCNC: 6.6 G/DL (ref 5.7–8.2)
RBC # BLD AUTO: 4.82 10*6/MM3 (ref 4.2–5.76)
SODIUM BLD-SCNC: 133 MMOL/L (ref 132–146)
TESTOST SERPL-MCNC: 244.96 NG/DL (ref 86.98–780.1)
WBC NRBC COR # BLD: 6.96 10*3/MM3 (ref 3.5–10.8)

## 2018-11-08 PROCEDURE — 80053 COMPREHEN METABOLIC PANEL: CPT | Performed by: INTERNAL MEDICINE

## 2018-11-08 PROCEDURE — 84403 ASSAY OF TOTAL TESTOSTERONE: CPT | Performed by: INTERNAL MEDICINE

## 2018-11-08 PROCEDURE — 36415 COLL VENOUS BLD VENIPUNCTURE: CPT

## 2018-11-08 PROCEDURE — 83036 HEMOGLOBIN GLYCOSYLATED A1C: CPT | Performed by: INTERNAL MEDICINE

## 2018-11-08 PROCEDURE — 85025 COMPLETE CBC W/AUTO DIFF WBC: CPT | Performed by: INTERNAL MEDICINE

## 2018-11-08 PROCEDURE — 99214 OFFICE O/P EST MOD 30 MIN: CPT | Performed by: INTERNAL MEDICINE

## 2018-11-08 RX ORDER — PREGABALIN 100 MG/1
100 CAPSULE ORAL 3 TIMES DAILY
Qty: 90 CAPSULE | Refills: 2 | Status: CANCELLED | OUTPATIENT
Start: 2018-11-08

## 2018-11-08 RX ORDER — GABAPENTIN 300 MG/1
300 CAPSULE ORAL 3 TIMES DAILY
Qty: 90 CAPSULE | Refills: 2 | Status: SHIPPED | OUTPATIENT
Start: 2018-11-08 | End: 2018-12-11 | Stop reason: SDUPTHER

## 2018-11-08 RX ORDER — GABAPENTIN 100 MG/1
100 CAPSULE ORAL 3 TIMES DAILY
Qty: 42 CAPSULE | Refills: 0 | Status: CANCELLED | OUTPATIENT
Start: 2018-11-08

## 2018-11-08 RX ORDER — GABAPENTIN 300 MG/1
300 CAPSULE ORAL 3 TIMES DAILY
Qty: 90 CAPSULE | Refills: 2 | Status: SHIPPED | OUTPATIENT
Start: 2018-11-08 | End: 2018-11-08 | Stop reason: SDUPTHER

## 2018-11-08 RX ORDER — ASPIRIN 81 MG/1
81 TABLET ORAL DAILY
Start: 2018-11-08

## 2018-11-08 NOTE — PROGRESS NOTES
63M here for f/u chronic issues.  Last visit 1 yr prior.    CAD/ Htn - compliant with current meds plavix, asa, atorva, repatha, carvedilol, amlodpine without c/o dizziness, cp, sob, palpitaitons, edema, claudication.  States he has yearly cardiac visit with Dr. Marroquin and thinks he is due in January, not scheduled.    DM2 - dx around 10yrs, not checking bs  Compliant with metformin - has been on synjardy or invokamet in past - not sure why stopped  Complicated by peripheral neuropathy  Eye exam is utd  Exercise: walks 7mi 2x/wk    Periph neuropathy - has been on lyrica 100mg tid with some effect, also taking gabapentin - 100mg tid - plan last time was to trial gabapentin instead of lyrica due to this med never being trialed and to see if it worked better.    Mid back pain - intermittent b/l , improving   Without f/c, night pain, or radiation to arms or legs.    Fatigue, h/o depression.  Documented h/o hypogonadism, pt states has never been trialed on testosterone.  Asking for level to be checked.  Admits to decrease libido.  Also h/o depression - on sertraline in past, not sure it was helpful.  Denies SI.    Patient Active Problem List   Diagnosis   • Atrial flutter (CMS/HCC)   • Coronary artery disease   • HTN (hypertension)   • Dyslipidemia   • Type 2 diabetes mellitus (CMS/HCC)   • Obesity   • GERD (gastroesophageal reflux disease)   • History of gout   • Idiopathic peripheral neuropathy   • Depression   • Anemia   • Migraine headache   • Low back strain   • Hypogonadism in male   • Esophageal stricture   • Hx of adenomatous colonic polyps   • Iron deficiency   • Elevated liver enzymes      Past Surgical History:   Procedure Laterality Date   • APPENDECTOMY     • CARDIAC ABLATION     • CORONARY ANGIOPLASTY     • CORONARY STENT PLACEMENT     • ESOPHAGEAL DILATATION     • TONSILLECTOMY          Current Outpatient Prescriptions:   •  amLODIPine (NORVASC) 2.5 MG tablet, TAKE ONE TABLET BY MOUTH DAILY, Disp: 30  tablet, Rfl: 4  •  aspirin 81 MG EC tablet, Take 1 tablet by mouth Daily., Disp: , Rfl:   •  atorvastatin (LIPITOR) 40 MG tablet, TAKE ONE TABLET BY MOUTH DAILY, Disp: 30 tablet, Rfl: 5  •  carvedilol (COREG) 6.25 MG tablet, TAKE ONE TABLET BY MOUTH TWICE A DAY WITH MEALS, Disp: 60 tablet, Rfl: 4  •  clopidogrel (PLAVIX) 75 MG tablet, TAKE ONE TABLET BY MOUTH DAILY, Disp: 30 tablet, Rfl: 5  •  Evolocumab (REPATHA SURECLICK) 140 MG/ML solution auto-injector, Inject  under the skin., Disp: , Rfl:   •  ferrous sulfate 325 (65 FE) MG tablet, Take 325 mg by mouth., Disp: , Rfl:   •  gabapentin (NEURONTIN) 300 MG capsule, Take 1 capsule by mouth 3 (Three) Times a Day., Disp: 90 capsule, Rfl: 2  •  hydrocortisone 0.5 % cream, Apply to affected area 2x/day for max of 2 weeks, Disp: 56 g, Rfl: 0  •  ketoconazole (NIZORAL) 2 % shampoo, Lather and apply to face, leave for 5 minutes, then rinse daily in the shower, Disp: 200 mL, Rfl: 0  •  lisinopril (PRINIVIL,ZESTRIL) 10 MG tablet, TAKE ONE TABLET BY MOUTH DAILY, Disp: 30 tablet, Rfl: 4  •  melatonin 3 MG tablet, Take 3 mg by mouth., Disp: , Rfl:   •  metFORMIN ER (GLUCOPHAGE XR) 500 MG 24 hr tablet, Take 1 tablet by mouth 2 (Two) Times a Day With Meals., Disp: 180 tablet, Rfl: 3  •  omeprazole (priLOSEC) 40 MG capsule, TAKE ONE CAPSULE BY MOUTH DAILY, Disp: 30 capsule, Rfl: 5   No Known Allergies  Social History     Social History   • Marital status:      Spouse name: N/A   • Number of children: 2   • Years of education: N/A     Occupational History   • insurance businessman      Social History Main Topics   • Smoking status: Never Smoker   • Smokeless tobacco: Never Used   • Alcohol use Yes      Comment: occ   • Drug use: No   • Sexual activity: Not on file     Other Topics Concern   • Not on file     Social History Narrative    11/17:    Working part time.    .    2 kids.    Lives alone.    Exercise: walks a lot at work 2d/wk    Dental: due    Eye: glasses,  "within last 2yrs      Family History   Problem Relation Age of Onset   • Diabetes Mother    • Heart disease Mother    • Heart disease Father       /62   Pulse 85   Ht 182.9 cm (72\")   Wt 98.4 kg (217 lb)   SpO2 98%   BMI 29.43 kg/m²   Gen: well appearing in nad, no resp effort  Eyes: conjunctiva clear, perrl, eomi  ENT: mmm, no thyromegaly, no lymphadenopathy  CV: s1, s2 reg no m/r/g, no bruits, no jvd  No peripheral edema, pedal pulses intact  Resp:  clear b/l no w/r/r  GI:  soft nt/nd  Skin: no clubbing or cyanosis  Neuro: decrease sensation to touch and vibration b/l lower extremities  Psych: flat affect      A/P    1. Coronary artery disease involving native heart without angina pectoris, unspecified vessel or lesion type - stable sx on current meds, advised f/u with cardiology   2. Hypertension, unspecified type - well controlled on current meds   3. Type 2 diabetes mellitus with diabetic polyneuropathy, without long-term current use of insulin (CMS/[facility]) - uncontrolled - on metformin 500mg bid  Will check A1C and trial adding a med: either Jardiance if affordable or glipizide   4. Idiopathic peripheral neuropathy - will trial gabapentin 300mg tid and f/u in 3mo  Explained to patient that face to face required q3mo for controlled meds  This patient is on a controlled substance which improves symptoms/quality of life and is aware of the risks, benefits and possible side-effects current treatment. The patient denies any medication side-effects at this time. A controlled substance agreement will be obtained or is currently on file. We reviewed required monitoring for controlled substances including but not limited to quarterly follow-up visits, annual depression screening, and urine drug screens to which the patient is agreeable. A GUDELIA report has been or shortly will be reviewed. There are no signs of deviation or misuse.   Plan of care reviewed with patient at the conclusion of today's visit. " Education was provided regarding diagnosis, management and any prescribed or recommended OTC medications.  Patient verbalizes understanding of and agreement with management plan.     5. Iron deficiency - cbc today   6. Elevated liver enzymes - lfts today   7. Fatigue, unspecified type - labs as ordered, will check testosterone level

## 2018-11-09 ENCOUNTER — TELEPHONE (OUTPATIENT)
Dept: FAMILY MEDICINE CLINIC | Facility: CLINIC | Age: 63
End: 2018-11-09

## 2018-11-09 RX ORDER — METFORMIN HYDROCHLORIDE EXTENDED-RELEASE TABLETS 1000 MG/1
1000 TABLET, FILM COATED, EXTENDED RELEASE ORAL 2 TIMES DAILY WITH MEALS
Qty: 180 TABLET | Refills: 1 | Status: SHIPPED | OUTPATIENT
Start: 2018-11-09 | End: 2018-11-09

## 2018-11-09 NOTE — TELEPHONE ENCOUNTER
Will send over metformin they do cover - let pt know and let him know he will still take 1000mg 2x/day, covered med is just shorter acting than the prior.  Should be about equally effective.    Bindu

## 2018-11-09 NOTE — TELEPHONE ENCOUNTER
PER PT CALLED, STATED HE WOULD LIKE A MONTHS SUPPLY OF [facility] SAMPLES. PT IS COMING IN TO DO BLOOD WORK ON Monday IS REQUESTING TO PICK THEM UP THE. PLEASE CALL PT BACK TO ADVISE.

## 2018-11-09 NOTE — TELEPHONE ENCOUNTER
Rinku called wanting to change the Metformin RX, states that his insurance will not cover what was sent in and would like to change to regular Metformin or complete a PA for the pt.

## 2018-11-09 NOTE — TELEPHONE ENCOUNTER
Called pt notified him of samples are ready for pickup, pt voiced understanding and appreciation and stated he would come get them monday

## 2018-11-12 ENCOUNTER — LAB (OUTPATIENT)
Dept: LAB | Facility: HOSPITAL | Age: 63
End: 2018-11-12

## 2018-11-12 DIAGNOSIS — R74.8 ELEVATED LIVER ENZYMES: ICD-10-CM

## 2018-11-12 DIAGNOSIS — R79.89 ELEVATED LFTS: ICD-10-CM

## 2018-11-12 LAB
HAV IGM SERPL QL IA: NORMAL
HBV CORE IGM SERPL QL IA: NORMAL
HBV SURFACE AB SER RIA-ACNC: NORMAL
HBV SURFACE AG SERPL QL IA: NORMAL
HCV AB SER DONR QL: NORMAL

## 2018-11-12 PROCEDURE — 86706 HEP B SURFACE ANTIBODY: CPT | Performed by: INTERNAL MEDICINE

## 2018-11-12 PROCEDURE — 36415 COLL VENOUS BLD VENIPUNCTURE: CPT

## 2018-11-12 PROCEDURE — 80074 ACUTE HEPATITIS PANEL: CPT | Performed by: INTERNAL MEDICINE

## 2018-11-12 PROCEDURE — 86704 HEP B CORE ANTIBODY TOTAL: CPT | Performed by: INTERNAL MEDICINE

## 2018-11-12 PROCEDURE — 86708 HEPATITIS A ANTIBODY: CPT | Performed by: INTERNAL MEDICINE

## 2018-11-12 RX ORDER — OMEPRAZOLE 40 MG/1
CAPSULE, DELAYED RELEASE ORAL
Qty: 30 CAPSULE | Refills: 4 | Status: SHIPPED | OUTPATIENT
Start: 2018-11-12 | End: 2019-04-09 | Stop reason: SDUPTHER

## 2018-11-13 LAB — HBV CORE AB SER DONR QL IA: NEGATIVE

## 2018-11-14 LAB — HAV AB SER QL IA: POSITIVE

## 2018-11-16 ENCOUNTER — TELEPHONE (OUTPATIENT)
Dept: URGENT CARE | Facility: CLINIC | Age: 63
End: 2018-11-16

## 2018-11-16 NOTE — TELEPHONE ENCOUNTER
PT CALLED BACK AND SAID HE ALREADY HAD THE HEP A SHOT AND WANTS TO KNOW IF THE SHOT CAUSED IT. PLEASE CALL PT BACK AND LET HIM KNOW.

## 2018-11-16 NOTE — TELEPHONE ENCOUNTER
Spoke with pt and informed him of Dr. Pavon recommendations. Pt verbalized understanding and stated he did get the letter in the mail. Pt wanted to know if the labs showed how long ago he contracted the infection. I informed pt I would have to speak with Dr. Pavon. 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 I informed pt that I would speak with Dr. Pavon but that he needed to use proper cooking etiquette and good hand hygiene. Pt verbalized understanding and had no further questions.

## 2018-11-16 NOTE — TELEPHONE ENCOUNTER
Advised patient that it was a test that shown he was previously exposed that he does not currently have Hep A nor will he infect others with Hep A. Pt verbalized understanding and had no further questions.

## 2018-11-16 NOTE — TELEPHONE ENCOUNTER
Can not tell when the exposure was.  He is not carrying hep a or infected, he can cook as usual.    Bindu

## 2018-11-16 NOTE — TELEPHONE ENCOUNTER
I sent letter 2d prior telling him    Your hepatitis a serologies indicate that you have prior exposure and recovery to hepatitis a.  It does not indicate an acute infection .    No need to pursue a hepatitis a vaccination at this time.      Bindu Patel

## 2018-11-16 NOTE — TELEPHONE ENCOUNTER
PT CALLED BACK AND SAID NOBODY HAS CALLED HIM BACK. HE SAID HE WANTS SOMEONE TO CALL HIM ASAP BECAUSE HE HAS QUESTIONS ABOUT HEP A. PT WAS TOLD THAT PCP HAD A LETTER IN HIS MYCHART BUT PT SAID HE CAN'T FIND IT

## 2018-11-16 NOTE — TELEPHONE ENCOUNTER
Pt calling to find out the treatment plan for his Positive Hep A lab results. Please advise as pt is concerned.

## 2018-12-11 ENCOUNTER — OFFICE VISIT (OUTPATIENT)
Dept: FAMILY MEDICINE CLINIC | Facility: CLINIC | Age: 63
End: 2018-12-11

## 2018-12-11 VITALS
DIASTOLIC BLOOD PRESSURE: 78 MMHG | HEIGHT: 72 IN | BODY MASS INDEX: 29.26 KG/M2 | OXYGEN SATURATION: 98 % | HEART RATE: 69 BPM | WEIGHT: 216 LBS | SYSTOLIC BLOOD PRESSURE: 128 MMHG

## 2018-12-11 DIAGNOSIS — G60.9 IDIOPATHIC PERIPHERAL NEUROPATHY: ICD-10-CM

## 2018-12-11 DIAGNOSIS — E11.42 TYPE 2 DIABETES MELLITUS WITH DIABETIC POLYNEUROPATHY, WITHOUT LONG-TERM CURRENT USE OF INSULIN (HCC): Primary | ICD-10-CM

## 2018-12-11 PROCEDURE — 99214 OFFICE O/P EST MOD 30 MIN: CPT | Performed by: INTERNAL MEDICINE

## 2018-12-11 RX ORDER — GABAPENTIN 300 MG/1
300 CAPSULE ORAL 4 TIMES DAILY
Qty: 120 CAPSULE | Refills: 2 | Status: SHIPPED | OUTPATIENT
Start: 2018-12-11 | End: 2019-03-09 | Stop reason: SDUPTHER

## 2018-12-11 NOTE — PROGRESS NOTES
63M here for uncontrolled diabetes f/u and peripheral neuropathy.    DM2 - uncontrolled  Pt has been compliant with metformin 1000mg bid with meals and farxiga 5mg daily since our visit 1mo prior.  Checking am bs and readings 191 - 319  - most in 200s.  States he has been working on diet , cutting out candy and smaller portion sizes.  No regular exercise.  He tolerated Farxiga well without any urinary sx.  He is pushing fluids.    Peripheral neuropathy of hands and feet - we increased his gabapentin dose - he is taking 600mg qam, 300mg afternoon, and 300mg nightly - he feels the increased dose has helped with the pain level.  He is tolerating this dose without excessive fatigue, sedation.    Review of Systems   General: no fatigue, fever/chills, unintentional wt loss, malaise, night sweats  Skin: no rash, no hives, no lesions,   Eyes: no visual disturbance   Heme: no brusing, no bleeding  ENT: no hearing loss, no dizziness, no nosebleed, no hoarseness  Endocrine: , no polyuria, polyphagia, + polydipsia, no heat or cold intolerance  GI: no nausea, no vomiting, no diarrhea, no constipation, no bleeding, no pain  : no dysuria, no urinary frequency,, no hematuria, or incontinence  Extremities: no edema, , no claudication  Cardiac: no chest pain, no palpitations, no orthopnea, no PND  Respiratory: no cough, no sputum, no wheezing, no sob , no hemoptysis  Neuro: no headache, no seizure,, no weakness  Psych: no anxiety, no depression    Patient Active Problem List   Diagnosis   • Atrial flutter (CMS/HCC)   • Coronary artery disease   • HTN (hypertension)   • Dyslipidemia   • Type 2 diabetes mellitus (CMS/HCC)   • Obesity   • GERD (gastroesophageal reflux disease)   • History of gout   • Idiopathic peripheral neuropathy   • Depression   • Anemia   • Migraine headache   • Low back strain   • Hypogonadism in male   • Esophageal stricture   • Hx of adenomatous colonic polyps   • Iron deficiency   • Elevated liver enzymes       Past Surgical History:   Procedure Laterality Date   • APPENDECTOMY     • CARDIAC ABLATION     • CORONARY ANGIOPLASTY     • CORONARY STENT PLACEMENT     • ESOPHAGEAL DILATATION     • TONSILLECTOMY          Current Outpatient Medications:   •  amLODIPine (NORVASC) 2.5 MG tablet, TAKE ONE TABLET BY MOUTH DAILY, Disp: 30 tablet, Rfl: 4  •  aspirin 81 MG EC tablet, Take 1 tablet by mouth Daily., Disp: , Rfl:   •  atorvastatin (LIPITOR) 40 MG tablet, TAKE ONE TABLET BY MOUTH DAILY, Disp: 30 tablet, Rfl: 5  •  carvedilol (COREG) 6.25 MG tablet, TAKE ONE TABLET BY MOUTH TWICE A DAY WITH MEALS, Disp: 60 tablet, Rfl: 4  •  clopidogrel (PLAVIX) 75 MG tablet, TAKE ONE TABLET BY MOUTH DAILY, Disp: 30 tablet, Rfl: 5  •  Evolocumab (REPATHA SURECLICK) 140 MG/ML solution auto-injector, Inject  under the skin., Disp: , Rfl:   •  ferrous sulfate 325 (65 FE) MG tablet, Take 325 mg by mouth., Disp: , Rfl:   •  gabapentin (NEURONTIN) 300 MG capsule, Take 1 capsule by mouth 4 (Four) Times a Day., Disp: 120 capsule, Rfl: 2  •  hydrocortisone 0.5 % cream, Apply to affected area 2x/day for max of 2 weeks, Disp: 56 g, Rfl: 0  •  ketoconazole (NIZORAL) 2 % shampoo, Lather and apply to face, leave for 5 minutes, then rinse daily in the shower, Disp: 200 mL, Rfl: 0  •  lisinopril (PRINIVIL,ZESTRIL) 10 MG tablet, TAKE ONE TABLET BY MOUTH DAILY, Disp: 30 tablet, Rfl: 4  •  melatonin 3 MG tablet, Take 3 mg by mouth., Disp: , Rfl:   •  metFORMIN (GLUCOPHAGE) 1000 MG tablet, Take 1 tablet by mouth 2 (Two) Times a Day With Meals., Disp: 180 tablet, Rfl: 3  •  omeprazole (priLOSEC) 40 MG capsule, TAKE ONE CAPSULE BY MOUTH DAILY, Disp: 30 capsule, Rfl: 4  •  Dapagliflozin Propanediol (FARXIGA) 10 MG tablet, Take 10 mg by mouth Daily., Disp: 90 tablet, Rfl: 1  •  Semaglutide (OZEMPIC) 0.25 or 0.5 MG/DOSE solution pen-injector, Inject 0.5 mg under the skin into the appropriate area as directed 1 (One) Time Per Week., Disp: 1.5 mL, Rfl: 3   No Known  "Allergies  Social History     Socioeconomic History   • Marital status:      Spouse name: Not on file   • Number of children: 2   • Years of education: Not on file   • Highest education level: Not on file   Social Needs   • Financial resource strain: Not on file   • Food insecurity - worry: Not on file   • Food insecurity - inability: Not on file   • Transportation needs - medical: Not on file   • Transportation needs - non-medical: Not on file   Occupational History   • Occupation: insurance businessman   Tobacco Use   • Smoking status: Never Smoker   • Smokeless tobacco: Never Used   Substance and Sexual Activity   • Alcohol use: Yes     Comment: occ   • Drug use: No   • Sexual activity: Not on file   Other Topics Concern   • Not on file   Social History Narrative    11/17:    Working part time.    .    2 kids.    Lives alone.    Exercise: walks a lot at work 2d/wk    Dental: due    Eye: glasses, within last 2yrs      Family History   Problem Relation Age of Onset   • Diabetes Mother    • Heart disease Mother    • Heart disease Father       /78   Pulse 69   Ht 182.9 cm (72\")   Wt 98 kg (216 lb)   SpO2 98%   BMI 29.29 kg/m²   Gen: well appearing in nad, no resp effort  Eyes: conjunctiva clear, perrl, eomi  ENT: mmm, no thyromegaly, no lymphadenopathy  CV: s1, s2 reg no m/r/g, no bruits, no jvd  No peripheral edema, pedal pulses intact  Resp:  clear b/l no w/r/r  GI:  soft nt/nd  Skin: no clubbing or cyanosis  Neuro: no focal deficits.    A/P    1. Type 2 diabetes mellitus with diabetic polyneuropathy, without long-term current use of insulin (CMS/[facility])    2. Idiopathic peripheral neuropathy      Will add ozempic weekly - sample given for first 6wks, med sent to pharmacy to check coverage - we injected his first dose in office 0.25mg sc in right abdomen, well tolerated.  He will continue metformin and farxiga.  Pt to cont efforts at lifestyle changes/ diet/ exercise.  Call in 4wks with bs " readings, call sooner if ozempic not covered.    Will maintain gabapentin at 4 capsules daily (2 in am, 1 in afternoon, 1 in evening) - med refill sent.    This patient is on a controlled substance which improves symptoms/quality of life and is aware of the risks, benefits and possible side-effects current treatment. The patient denies any medication side-effects at this time. A controlled substance agreement will be obtained or is currently on file. We reviewed required monitoring for controlled substances including but not limited to quarterly follow-up visits, annual depression screening, and urine drug screens to which the patient is agreeable. A GUDELIA report has been or shortly will be reviewed. There are no signs of deviation or misuse.     Plan of care reviewed with patient at the conclusion of today's visit. Education was provided regarding diagnosis, management and any prescribed or recommended OTC medications.  Patient verbalizes understanding of and agreement with management plan.      F/u 3mo.

## 2018-12-14 ENCOUNTER — TELEPHONE (OUTPATIENT)
Dept: FAMILY MEDICINE CLINIC | Facility: CLINIC | Age: 63
End: 2018-12-14

## 2018-12-14 NOTE — TELEPHONE ENCOUNTER
PER JUANITA FROM [facility] CALLED, IS REQUESTING OZEMPIC 0.25/0.5 .      [facility]

## 2018-12-19 ENCOUNTER — TELEPHONE (OUTPATIENT)
Dept: FAMILY MEDICINE CLINIC | Facility: CLINIC | Age: 63
End: 2018-12-19

## 2018-12-19 DIAGNOSIS — E11.42 TYPE 2 DIABETES MELLITUS WITH DIABETIC POLYNEUROPATHY, WITHOUT LONG-TERM CURRENT USE OF INSULIN (HCC): Primary | ICD-10-CM

## 2019-01-14 ENCOUNTER — TELEPHONE (OUTPATIENT)
Dept: FAMILY MEDICINE CLINIC | Facility: CLINIC | Age: 64
End: 2019-01-14

## 2019-01-14 NOTE — TELEPHONE ENCOUNTER
PT NEEDS AUTH LETTER TO BE ALLOWED TO CARRY WATER AT ALL TIMES PER EMPLOYER. NEEDS WATER DUE TO DIABETES.     PT WANTS CALL WHEN LETTER IS READY.

## 2019-02-02 RX ORDER — CARVEDILOL 6.25 MG/1
TABLET ORAL
Qty: 60 TABLET | Refills: 3 | Status: SHIPPED | OUTPATIENT
Start: 2019-02-02 | End: 2019-05-07 | Stop reason: SDUPTHER

## 2019-02-08 ENCOUNTER — OFFICE VISIT (OUTPATIENT)
Dept: FAMILY MEDICINE CLINIC | Facility: CLINIC | Age: 64
End: 2019-02-08

## 2019-02-08 VITALS
WEIGHT: 210 LBS | OXYGEN SATURATION: 99 % | BODY MASS INDEX: 28.44 KG/M2 | HEIGHT: 72 IN | SYSTOLIC BLOOD PRESSURE: 120 MMHG | HEART RATE: 82 BPM | DIASTOLIC BLOOD PRESSURE: 78 MMHG

## 2019-02-08 DIAGNOSIS — M25.561 ACUTE PAIN OF RIGHT KNEE: ICD-10-CM

## 2019-02-08 DIAGNOSIS — E11.42 TYPE 2 DIABETES MELLITUS WITH DIABETIC POLYNEUROPATHY, WITHOUT LONG-TERM CURRENT USE OF INSULIN (HCC): Primary | ICD-10-CM

## 2019-02-08 DIAGNOSIS — I10 HYPERTENSION, UNSPECIFIED TYPE: ICD-10-CM

## 2019-02-08 DIAGNOSIS — R74.8 ELEVATED LIVER ENZYMES: ICD-10-CM

## 2019-02-08 LAB — HBA1C MFR BLD: 8.9 %

## 2019-02-08 PROCEDURE — 83036 HEMOGLOBIN GLYCOSYLATED A1C: CPT | Performed by: INTERNAL MEDICINE

## 2019-02-08 PROCEDURE — 99214 OFFICE O/P EST MOD 30 MIN: CPT | Performed by: INTERNAL MEDICINE

## 2019-02-08 RX ORDER — NITROGLYCERIN 0.4 MG/1
TABLET SUBLINGUAL
COMMUNITY
Start: 2019-02-06 | End: 2019-11-25 | Stop reason: SDUPTHER

## 2019-02-08 RX ORDER — MELOXICAM 7.5 MG/1
7.5 TABLET ORAL DAILY
Qty: 14 TABLET | Refills: 0 | Status: SHIPPED | OUTPATIENT
Start: 2019-02-08 | End: 2019-05-07 | Stop reason: SDUPTHER

## 2019-02-08 NOTE — PROGRESS NOTES
"63M here for f/u chronic issues.    -right knee acute on chronic pain - past 2wks bad, no recent trauma.  Can not say it is swelling or red.  Worse with walking/ bending.  Some tylenol for pain, not helpful.      DM2 -  Not checking bs at home.  Trying to watch his diet, cut down on glucose.  Meds: Farxiga, metformin, ozempic  Ozempic new past 2mo, on 0.5mg weekly dose - tolerating med without n/v/abd pain or diarrhea.    Obesity:  Has lost weight steadily, another 7lbs since adding ozempic and improving diet.  Exercise: some.    Htn - compliant with meds and no home checks,  Always well controlled in office.    Review of Systems   General: no fatigue, fever/chills, unintentional wt loss, malaise, night sweats  Skin: no rash, no hives, no lesions,   Eyes: no visual disturbance   Heme: no brusing, no bleeding  ENT: no hearing loss, no dizziness, no nosebleed, no hoarseness  Endocrine: , no polyuria, polyphagia, polydipsia, no heat or cold intolerance  GI: no nausea, no vomiting, no diarrhea, no constipation, no bleeding, no pain  : no dysuria, no urinary frequency,, no hematuria, or incontinence  Extremities: no edema, , no claudication  Cardiac: no chest pain, no palpitations, no orthopnea, no PND  Respiratory: no cough, no sputum, no wheezing, no sob , no hemoptysis  Neuro: no headache, no seizure,, no paresthesias or weakness  Psych: no anxiety, no depression    The following portions of the patient's history were reviewed and updated as appropriate: allergies, current medications, past family history, past medical history, past social history, past surgical history and problem list.      /78   Pulse 82   Ht 182.9 cm (72\")   Wt 95.3 kg (210 lb)   SpO2 99%   BMI 28.48 kg/m²   Gen: well appearing in nad, no resp effort  Eyes: conjunctiva clear, perrl, eomi  ENT: mmm, no thyromegaly, no lymphadenopathy  CV: s1, s2 reg no m/r/g, no bruits, no jvd  No peripheral edema, pedal pulses intact  Resp:  clear b/l " no w/r/r  GI:  soft nt/nd  Skin: no clubbing or cyanosis  Neuro: no focal deficits.  MS: right knee with bony prominence c/w left, no effusion, warmth, swelling or tenderness to palpation at all landmarks, no popliteal fossa tenderness or swelling.  Full flex/ ext without significant crepitus.  Joint stable to varus/ valgus stress maneuver, intact to anterior/ posterior Drawer testing.    A/P    1. Type 2 diabetes mellitus with diabetic polyneuropathy, without long-term current use of insulin (CMS/[facility])   -improved control with diet and addition of ozempic  Will increase ozempic to 1mg weekly sc dose  Continue efforts at diet and wt loss  F/u in 3mo with new pcp  Counseled pt on importance of med compliance and monitoring BS level.      2. Hypertension, unspecified type - well controlled   3. Elevated liver enzymes - mild, stable over past year, no evidence of fatty liver on u/s - suspect statin induced,  Monitor q6mo   4. Acute pain of right knee - suspect flare of OA - will get wt bearing xray today to assess given acute on chronic, pt to initiate meloxicam with food once daily and see if it settles.  Pt declines PT, if not improving, will refer to ortho for further intervention.       Plan of care reviewed with patient at the conclusion of today's visit. Education was provided regarding diagnosis, management and any prescribed or recommended OTC medications.  Patient verbalizes understanding of and agreement with management plan.

## 2019-02-13 ENCOUNTER — PRIOR AUTHORIZATION (OUTPATIENT)
Dept: FAMILY MEDICINE CLINIC | Facility: CLINIC | Age: 64
End: 2019-02-13

## 2019-02-13 NOTE — TELEPHONE ENCOUNTER
Received PA for Ozempic. Attempted to start on cover my meds. The PA has already been sent through and approved.   Key: [id number]  No further actions needed.

## 2019-03-09 RX ORDER — GABAPENTIN 300 MG/1
CAPSULE ORAL
Qty: 120 CAPSULE | Refills: 1 | Status: SHIPPED | OUTPATIENT
Start: 2019-03-09 | End: 2019-05-07 | Stop reason: SDUPTHER

## 2019-03-09 NOTE — TELEPHONE ENCOUNTER
Last fill: 12/11/18  Last office visit: 2/8/19  Next office visit: 5/7/19  Last Isaiah: Today  Controlled substance contract on file? Yes  Last UDS: 11/8/18

## 2019-03-18 RX ORDER — LISINOPRIL 10 MG/1
TABLET ORAL
Qty: 30 TABLET | Refills: 3 | Status: SHIPPED | OUTPATIENT
Start: 2019-03-18 | End: 2019-05-07 | Stop reason: SDUPTHER

## 2019-03-18 RX ORDER — AMLODIPINE BESYLATE 2.5 MG/1
TABLET ORAL
Qty: 30 TABLET | Refills: 3 | Status: SHIPPED | OUTPATIENT
Start: 2019-03-18 | End: 2019-05-07 | Stop reason: SDUPTHER

## 2019-03-18 RX ORDER — CLOPIDOGREL BISULFATE 75 MG/1
TABLET ORAL
Qty: 30 TABLET | Refills: 4 | Status: SHIPPED | OUTPATIENT
Start: 2019-03-18 | End: 2019-05-07 | Stop reason: SDUPTHER

## 2019-03-21 RX ORDER — ATORVASTATIN CALCIUM 40 MG/1
TABLET, FILM COATED ORAL
Qty: 25 TABLET | Refills: 4 | Status: SHIPPED | OUTPATIENT
Start: 2019-03-21 | End: 2019-05-07 | Stop reason: SDUPTHER

## 2019-04-09 RX ORDER — OMEPRAZOLE 40 MG/1
CAPSULE, DELAYED RELEASE ORAL
Qty: 30 CAPSULE | Refills: 3 | Status: SHIPPED | OUTPATIENT
Start: 2019-04-09 | End: 2019-05-07 | Stop reason: SDUPTHER

## 2019-05-07 ENCOUNTER — LAB (OUTPATIENT)
Dept: LAB | Facility: HOSPITAL | Age: 64
End: 2019-05-07

## 2019-05-07 ENCOUNTER — OFFICE VISIT (OUTPATIENT)
Dept: FAMILY MEDICINE CLINIC | Facility: CLINIC | Age: 64
End: 2019-05-07

## 2019-05-07 VITALS
DIASTOLIC BLOOD PRESSURE: 76 MMHG | HEART RATE: 98 BPM | SYSTOLIC BLOOD PRESSURE: 128 MMHG | BODY MASS INDEX: 28.44 KG/M2 | HEIGHT: 72 IN | WEIGHT: 210 LBS | OXYGEN SATURATION: 97 %

## 2019-05-07 DIAGNOSIS — E78.5 DYSLIPIDEMIA: ICD-10-CM

## 2019-05-07 DIAGNOSIS — M54.5 CHRONIC MIDLINE LOW BACK PAIN, WITH SCIATICA PRESENCE UNSPECIFIED: ICD-10-CM

## 2019-05-07 DIAGNOSIS — I10 ESSENTIAL HYPERTENSION: ICD-10-CM

## 2019-05-07 DIAGNOSIS — E11.42 TYPE 2 DIABETES MELLITUS WITH DIABETIC POLYNEUROPATHY, WITHOUT LONG-TERM CURRENT USE OF INSULIN (HCC): ICD-10-CM

## 2019-05-07 DIAGNOSIS — L98.9 SKIN LESION: ICD-10-CM

## 2019-05-07 DIAGNOSIS — I25.10 CORONARY ARTERY DISEASE INVOLVING NATIVE HEART WITHOUT ANGINA PECTORIS, UNSPECIFIED VESSEL OR LESION TYPE: ICD-10-CM

## 2019-05-07 DIAGNOSIS — E11.42 TYPE 2 DIABETES MELLITUS WITH DIABETIC POLYNEUROPATHY, WITHOUT LONG-TERM CURRENT USE OF INSULIN (HCC): Primary | ICD-10-CM

## 2019-05-07 DIAGNOSIS — F33.0 MILD EPISODE OF RECURRENT DEPRESSIVE DISORDER (HCC): ICD-10-CM

## 2019-05-07 DIAGNOSIS — R53.82 CHRONIC FATIGUE: ICD-10-CM

## 2019-05-07 DIAGNOSIS — K22.2 ESOPHAGEAL STRICTURE: ICD-10-CM

## 2019-05-07 DIAGNOSIS — K21.9 GASTROESOPHAGEAL REFLUX DISEASE, ESOPHAGITIS PRESENCE NOT SPECIFIED: ICD-10-CM

## 2019-05-07 DIAGNOSIS — R25.2 LEG CRAMPS: ICD-10-CM

## 2019-05-07 DIAGNOSIS — G89.29 CHRONIC MIDLINE LOW BACK PAIN, WITH SCIATICA PRESENCE UNSPECIFIED: ICD-10-CM

## 2019-05-07 LAB
ALBUMIN SERPL-MCNC: 4.5 G/DL (ref 3.5–5.2)
ALBUMIN UR-MCNC: 1.4 MG/L
ALBUMIN/GLOB SERPL: 1.6 G/DL
ALP SERPL-CCNC: 160 U/L (ref 39–117)
ALT SERPL W P-5'-P-CCNC: 49 U/L (ref 1–41)
ANION GAP SERPL CALCULATED.3IONS-SCNC: 12.9 MMOL/L
AST SERPL-CCNC: 55 U/L (ref 1–40)
BASOPHILS # BLD AUTO: 0.06 10*3/MM3 (ref 0–0.2)
BASOPHILS NFR BLD AUTO: 0.7 % (ref 0–1.5)
BILIRUB SERPL-MCNC: 0.8 MG/DL (ref 0.2–1.2)
BUN BLD-MCNC: 15 MG/DL (ref 8–23)
BUN/CREAT SERPL: 18.3 (ref 7–25)
CALCIUM SPEC-SCNC: 9.5 MG/DL (ref 8.6–10.5)
CHLORIDE SERPL-SCNC: 96 MMOL/L (ref 98–107)
CHOLEST SERPL-MCNC: 111 MG/DL (ref 0–200)
CO2 SERPL-SCNC: 24.1 MMOL/L (ref 22–29)
CREAT BLD-MCNC: 0.82 MG/DL (ref 0.76–1.27)
DEPRECATED RDW RBC AUTO: 43.8 FL (ref 37–54)
EOSINOPHIL # BLD AUTO: 0.26 10*3/MM3 (ref 0–0.4)
EOSINOPHIL NFR BLD AUTO: 3 % (ref 0.3–6.2)
ERYTHROCYTE [DISTWIDTH] IN BLOOD BY AUTOMATED COUNT: 12.5 % (ref 12.3–15.4)
GFR SERPL CREATININE-BSD FRML MDRD: 94 ML/MIN/1.73
GLOBULIN UR ELPH-MCNC: 2.8 GM/DL
GLUCOSE BLD-MCNC: 219 MG/DL (ref 65–99)
GLUCOSE BLDC GLUCOMTR-MCNC: 239 MG/DL (ref 70–130)
HBA1C MFR BLD: 8.3 %
HCT VFR BLD AUTO: 47.9 % (ref 37.5–51)
HDLC SERPL-MCNC: 48 MG/DL (ref 40–60)
HGB BLD-MCNC: 15.4 G/DL (ref 13–17.7)
IMM GRANULOCYTES # BLD AUTO: 0.05 10*3/MM3 (ref 0–0.05)
IMM GRANULOCYTES NFR BLD AUTO: 0.6 % (ref 0–0.5)
LDLC SERPL CALC-MCNC: 32 MG/DL (ref 0–100)
LDLC/HDLC SERPL: 0.67 {RATIO}
LYMPHOCYTES # BLD AUTO: 1.98 10*3/MM3 (ref 0.7–3.1)
LYMPHOCYTES NFR BLD AUTO: 23.1 % (ref 19.6–45.3)
MAGNESIUM SERPL-MCNC: 1.8 MG/DL (ref 1.6–2.4)
MCH RBC QN AUTO: 31 PG (ref 26.6–33)
MCHC RBC AUTO-ENTMCNC: 32.2 G/DL (ref 31.5–35.7)
MCV RBC AUTO: 96.6 FL (ref 79–97)
MONOCYTES # BLD AUTO: 0.6 10*3/MM3 (ref 0.1–0.9)
MONOCYTES NFR BLD AUTO: 7 % (ref 5–12)
NEUTROPHILS # BLD AUTO: 5.61 10*3/MM3 (ref 1.7–7)
NEUTROPHILS NFR BLD AUTO: 65.6 % (ref 42.7–76)
NRBC BLD AUTO-RTO: 0 /100 WBC (ref 0–0.2)
PLATELET # BLD AUTO: 188 10*3/MM3 (ref 140–450)
PMV BLD AUTO: 10.6 FL (ref 6–12)
POTASSIUM BLD-SCNC: 4.6 MMOL/L (ref 3.5–5.2)
PROT SERPL-MCNC: 7.3 G/DL (ref 6–8.5)
RBC # BLD AUTO: 4.96 10*6/MM3 (ref 4.14–5.8)
SODIUM BLD-SCNC: 133 MMOL/L (ref 136–145)
TRIGL SERPL-MCNC: 155 MG/DL (ref 0–150)
TSH SERPL DL<=0.05 MIU/L-ACNC: 1.19 MIU/ML (ref 0.27–4.2)
VIT B12 BLD-MCNC: 1430 PG/ML (ref 211–946)
VLDLC SERPL-MCNC: 31 MG/DL (ref 5–40)
WBC NRBC COR # BLD: 8.56 10*3/MM3 (ref 3.4–10.8)

## 2019-05-07 PROCEDURE — 82962 GLUCOSE BLOOD TEST: CPT | Performed by: FAMILY MEDICINE

## 2019-05-07 PROCEDURE — 80061 LIPID PANEL: CPT | Performed by: FAMILY MEDICINE

## 2019-05-07 PROCEDURE — 82043 UR ALBUMIN QUANTITATIVE: CPT | Performed by: FAMILY MEDICINE

## 2019-05-07 PROCEDURE — 83036 HEMOGLOBIN GLYCOSYLATED A1C: CPT | Performed by: FAMILY MEDICINE

## 2019-05-07 PROCEDURE — 85025 COMPLETE CBC W/AUTO DIFF WBC: CPT | Performed by: FAMILY MEDICINE

## 2019-05-07 PROCEDURE — 84443 ASSAY THYROID STIM HORMONE: CPT | Performed by: FAMILY MEDICINE

## 2019-05-07 PROCEDURE — 99214 OFFICE O/P EST MOD 30 MIN: CPT | Performed by: FAMILY MEDICINE

## 2019-05-07 PROCEDURE — 83735 ASSAY OF MAGNESIUM: CPT | Performed by: FAMILY MEDICINE

## 2019-05-07 PROCEDURE — 80053 COMPREHEN METABOLIC PANEL: CPT | Performed by: FAMILY MEDICINE

## 2019-05-07 PROCEDURE — 36415 COLL VENOUS BLD VENIPUNCTURE: CPT | Performed by: FAMILY MEDICINE

## 2019-05-07 PROCEDURE — 82607 VITAMIN B-12: CPT | Performed by: FAMILY MEDICINE

## 2019-05-07 RX ORDER — MELOXICAM 7.5 MG/1
7.5 TABLET ORAL DAILY PRN
Qty: 90 TABLET | Refills: 1 | Status: SHIPPED | OUTPATIENT
Start: 2019-05-07 | End: 2019-11-25 | Stop reason: SDUPTHER

## 2019-05-07 RX ORDER — AMLODIPINE BESYLATE 2.5 MG/1
2.5 TABLET ORAL DAILY
Qty: 90 TABLET | Refills: 1 | Status: SHIPPED | OUTPATIENT
Start: 2019-05-07 | End: 2019-09-13

## 2019-05-07 RX ORDER — CARVEDILOL 6.25 MG/1
6.25 TABLET ORAL 2 TIMES DAILY WITH MEALS
Qty: 180 TABLET | Refills: 1 | Status: SHIPPED | OUTPATIENT
Start: 2019-05-07 | End: 2019-09-13 | Stop reason: SDUPTHER

## 2019-05-07 RX ORDER — GABAPENTIN 300 MG/1
300 CAPSULE ORAL 4 TIMES DAILY
Qty: 360 CAPSULE | Refills: 0 | Status: SHIPPED | OUTPATIENT
Start: 2019-05-07 | End: 2019-08-06 | Stop reason: SDUPTHER

## 2019-05-07 RX ORDER — LISINOPRIL 10 MG/1
10 TABLET ORAL DAILY
Qty: 90 TABLET | Refills: 1 | Status: SHIPPED | OUTPATIENT
Start: 2019-05-07 | End: 2020-01-08 | Stop reason: SDUPTHER

## 2019-05-07 RX ORDER — SERTRALINE HYDROCHLORIDE 25 MG/1
25 TABLET, FILM COATED ORAL DAILY
Qty: 90 TABLET | Refills: 1 | Status: SHIPPED | OUTPATIENT
Start: 2019-05-07 | End: 2019-08-06

## 2019-05-07 RX ORDER — OMEPRAZOLE 40 MG/1
40 CAPSULE, DELAYED RELEASE ORAL DAILY
Qty: 90 CAPSULE | Refills: 1 | Status: SHIPPED | OUTPATIENT
Start: 2019-05-07 | End: 2020-02-10

## 2019-05-07 RX ORDER — CLOPIDOGREL BISULFATE 75 MG/1
75 TABLET ORAL DAILY
Qty: 90 TABLET | Refills: 1 | Status: SHIPPED | OUTPATIENT
Start: 2019-05-07 | End: 2019-09-13

## 2019-05-07 RX ORDER — ATORVASTATIN CALCIUM 40 MG/1
40 TABLET, FILM COATED ORAL NIGHTLY
Qty: 90 TABLET | Refills: 1 | Status: SHIPPED | OUTPATIENT
Start: 2019-05-07 | End: 2020-05-01 | Stop reason: SDUPTHER

## 2019-05-07 NOTE — PROGRESS NOTES
Chief Complaint   Patient presents with   • Diabetes        Diabetes   He presents for his follow-up diabetic visit. He has type 2 diabetes mellitus. Associated symptoms include fatigue. Pertinent negatives for diabetes include no chest pain. Diabetic complications include heart disease and peripheral neuropathy. Current diabetic treatment includes oral agent (dual therapy) (GLP-1a). An ACE inhibitor/angiotensin II receptor blocker is being taken.      No home glucose monitoring. Eats too much chocolate.     Numbness in right hand. Numbness and pain in right side of foot. Occasionally radiates in arm and leg. Neuropathy has gotten worse. Gabapentin seems to help a little bit. He now takes 1 morning, 1 at lunch and 2 at night. Nausea if he doesn't take it with food.     Red spots on his left forehead. Scratches feel sensitive on right side of face. Gets flaky sometimes. Tried shampoo and cortisone without improvement.     Back pain:  Lower and middle back pain for months. Worse bending over. Not exercising much.  Unsure if meloxicam helped in based. Uses OTC medication at times and it helps. If legs are bothering him he can't walk far and needs handicap parking.     Cramps in back of legs and knee pain.     Pissy mood all the time. Sometimes can't make a decision. Doesn't want to cook and he eats out. Long time ago took sertraline.     Esophagus stretched 5 times in past 10-15 years, needs to be done again in [place].  He has difficulty swallowing.  Sometimes he has increased drainage and choking while eating.  He has terrible heartburn if he misses omeprazole.      He's done magnesium, more bananas, and tonic water. Jerks awake at night. Sometimes takes an over the counter medication. Has to get up and stretch.     Memory is slipping.       Review of Systems   Constitutional: Positive for fatigue.   HENT: Positive for trouble swallowing.    Cardiovascular: Negative for chest pain and palpitations.    Musculoskeletal: Positive for arthralgias, back pain and myalgias.   Skin: Positive for skin lesions.   Neurological: Positive for numbness and memory problem.   Psychiatric/Behavioral: Positive for depressed mood. Negative for suicidal ideas.        PHQ-2/PHQ-9 Depression Screening 4/5/2018   Little interest or pleasure in doing things 1   Feeling down, depressed, or hopeless 1   Trouble falling or staying asleep, or sleeping too much 1   Feeling tired or having little energy 3   Poor appetite or overeating 0   Feeling bad about yourself - or that you are a failure or have let yourself or your family down 0   Trouble concentrating on things, such as reading the newspaper or watching television 0   Moving or speaking so slowly that other people could have noticed. Or the opposite - being so fidgety or restless that you have been moving around a lot more than usual 0   Thoughts that you would be better off dead, or of hurting yourself in some way 0   Total Score 6   If you checked off any problems, how difficult have these problems made it for you to do your work, take care of things at home, or get along with other people? Somewhat difficult                   Current Outpatient Medications on File Prior to Visit   Medication Sig Dispense Refill   • aspirin 81 MG EC tablet Take 1 tablet by mouth Daily.     • Evolocumab (REPATHA SURECLICK) 140 MG/ML solution auto-injector Inject  under the skin.     • ferrous sulfate 325 (65 FE) MG tablet Take 325 mg by mouth.     • glucose blood test strip Use to test blood sugars once daily. 50 each 11   • hydrocortisone 0.5 % cream Apply to affected area 2x/day for max of 2 weeks 56 g 0   • ketoconazole (NIZORAL) 2 % shampoo Lather and apply to face, leave for 5 minutes, then rinse daily in the shower 200 mL 0   • melatonin 3 MG tablet Take 10 mg by mouth.     • metFORMIN (GLUCOPHAGE) 1000 MG tablet Take 1 tablet by mouth 2 (Two) Times a Day With Meals. 180 tablet 3   •  nitroglycerin (NITROSTAT) 0.4 MG SL tablet      • Semaglutide (OZEMPIC) 1 MG/DOSE solution pen-injector Inject 1 mg under the skin into the appropriate area as directed 1 (One) Time Per Week. 4 pen 5   • [DISCONTINUED] amLODIPine (NORVASC) 2.5 MG tablet TAKE ONE TABLET BY MOUTH DAILY 30 tablet 3   • [DISCONTINUED] atorvastatin (LIPITOR) 40 MG tablet TAKE ONE TABLET BY MOUTH DAILY 25 tablet 4   • [DISCONTINUED] carvedilol (COREG) 6.25 MG tablet TAKE ONE TABLET BY MOUTH TWICE A DAY WITH MEALS 60 tablet 3   • [DISCONTINUED] clopidogrel (PLAVIX) 75 MG tablet TAKE ONE TABLET BY MOUTH DAILY 30 tablet 4   • [DISCONTINUED] Dapagliflozin Propanediol (FARXIGA) 10 MG tablet Take 10 mg by mouth Daily. 90 tablet 1   • [DISCONTINUED] gabapentin (NEURONTIN) 300 MG capsule TAKE ONE CAPSULE BY MOUTH FOUR TIMES A  capsule 1   • [DISCONTINUED] lisinopril (PRINIVIL,ZESTRIL) 10 MG tablet TAKE ONE TABLET BY MOUTH DAILY 30 tablet 3   • [DISCONTINUED] omeprazole (priLOSEC) 40 MG capsule TAKE ONE CAPSULE BY MOUTH DAILY 30 capsule 3   • [DISCONTINUED] meloxicam (MOBIC) 7.5 MG tablet Take 1 tablet by mouth Daily. 14 tablet 0     No current facility-administered medications on file prior to visit.        No Known Allergies    Past Medical History:   Diagnosis Date   • A-fib (CMS/HCC)    • Adenomatous colon polyp    • Allergic    • Anemia    • Atrial flutter (CMS/HCC)    • Carpal tunnel syndrome    • Cervicalgia    • Coronary artery disease    • Depression    • Dyslipidemia    • Esophageal stricture    • GERD (gastroesophageal reflux disease)    • History of gout    • Hyperlipidemia    • Hypertension    • Hypogonadism male    • Left ventricular hypertrophy    • Low back pain    • Migraines    • Nephrolithiasis    • Obesity    • Osteoarthritis    • Peripheral neuropathy    • Type 2 diabetes mellitus (CMS/HCC)         Past Surgical History:   Procedure Laterality Date   • APPENDECTOMY     • CARDIAC ABLATION     • CORONARY ANGIOPLASTY     •  "CORONARY STENT PLACEMENT     • ESOPHAGEAL DILATATION     • TONSILLECTOMY          Family History   Problem Relation Age of Onset   • Diabetes Mother    • Heart disease Mother    • Heart disease Father         Social History     Socioeconomic History   • Marital status:      Spouse name: Not on file   • Number of children: 2   • Years of education: Not on file   • Highest education level: Not on file   Occupational History   • Occupation: insurance businessman   Tobacco Use   • Smoking status: Never Smoker   • Smokeless tobacco: Never Used   Substance and Sexual Activity   • Alcohol use: Yes     Comment: occ   • Drug use: No   Social History Narrative    11/17:    Working part time.    .    2 kids.    Lives alone.    Exercise: walks a lot at work 2d/wk    Dental: due    Eye: glasses, within last 2yrs        Visit Vitals  /76 (BP Location: Left arm, Patient Position: Sitting, Cuff Size: Adult)   Pulse 98   Ht 182.9 cm (72\")   Wt 95.3 kg (210 lb)   SpO2 97%   BMI 28.48 kg/m²        Physical Exam   Constitutional: No distress.   Cardiovascular: Normal rate and regular rhythm.   No murmur heard.  Pulses:       Popliteal pulses are 1+ on the right side, and 1+ on the left side.        Dorsalis pedis pulses are 1+ on the right side, and 1+ on the left side.   Pulmonary/Chest: Effort normal and breath sounds normal.   Musculoskeletal:        Right knee: He exhibits normal range of motion and no swelling. No tenderness found.        Left knee: He exhibits normal range of motion and no swelling. No tenderness found.        Lumbar back: He exhibits no bony tenderness and no spasm.   Bilateral knee crepitus    Bryan had a diabetic foot exam performed today.   During the foot exam he had a monofilament test performed (Decreased sensation right great toe).  Vascular Status -  His right foot exhibits no edema. His left foot exhibits no edema.  Skin Integrity  -  His right foot skin is intact.His left foot " skin is intact..  Skin:   Erythema with rough scale left forehead bilateral cheeks   Psychiatric: He is agitated.   Irritable mood   Vitals reviewed.      Results for orders placed or performed in visit on 05/07/19   POC Glycosylated Hemoglobin (Hb A1C)   Result Value Ref Range    Hemoglobin A1C 8.3 %   POC Glucose   Result Value Ref Range    Glucose 239 (A) 70 - 130 mg/dL      CMP:  Lab Results   Component Value Date    BUN 16 11/08/2018    CREATININE 0.99 11/08/2018    EGFRIFNONA 76 11/08/2018    BCR 16.2 11/08/2018     11/08/2018    K 4.3 11/08/2018    CO2 23.0 11/08/2018    CALCIUM 8.8 11/08/2018    ALBUMIN 4.40 11/08/2018    BILITOT 0.9 11/08/2018    ALKPHOS 218 (H) 11/08/2018    AST 74 (H) 11/08/2018    ALT 75 (H) 11/08/2018     Lipid Panel:  Lab Results   Component Value Date    CHOL 97 11/20/2017    TRIG 133 11/20/2017    HDL 47 11/20/2017    LDL 31 11/20/2017     HbA1c:  Lab Results   Component Value Date    HGBA1C 8.3 05/07/2019    HGBA1C 8.9 02/08/2019     Microalbumin:  No results found for: VERN Adams was seen today for diabetes.    Diagnoses and all orders for this visit:    Type 2 diabetes mellitus with diabetic polyneuropathy, without long-term current use of insulin (CMS/[facility])  -     MicroAlbumin, Urine, Random - Urine, Clean Catch; Future  -     POC Glycosylated Hemoglobin (Hb A1C)  -     POC Glucose  -     lisinopril (PRINIVIL,ZESTRIL) 10 MG tablet; Take 1 tablet by mouth Daily.  -     gabapentin (NEURONTIN) 300 MG capsule; Take 1 capsule by mouth 4 (Four) Times a Day.  -     Dapagliflozin Propanediol (FARXIGA) 10 MG tablet; Take 10 mg by mouth Daily.  -     atorvastatin (LIPITOR) 40 MG tablet; Take 1 tablet by mouth Every Night.  -     Lipid Panel; Future  -     Comprehensive Metabolic Panel; Future  -     Lipid Panel  -     Comprehensive Metabolic Panel  Uncontrolled.  Patient has poor diet.  Continue Ozempic, Farxiga, and metformin.  Microalbumin collected today, patient  is already on ACE inhibitor.  Patient is already taking high intensity statin.  Patient is already taking aspirin therapy.  Skin lesion  -     Ambulatory Referral to Dermatology  Patient is concerned about the lesions not improving with ketoconazole topical steroid.  Discussed with patient and they could represent actinic keratoses and treatment would include cryotherapy.  He is interested in second opinion with dermatology.  Mild episode of recurrent depressive disorder (CMS/HCC)  -     sertraline (ZOLOFT) 25 MG tablet; Take 1 tablet by mouth Daily.  New.  Plan to start low-dose of sertraline as he has taken it in the past.  Discussed potential side effects to monitor for.  Gastroesophageal reflux disease, esophagitis presence not specified  -     omeprazole (priLOSEC) 40 MG capsule; Take 1 capsule by mouth Daily.  -     Ambulatory Referral to Gastroenterology  Patient was previously seen by gastroenterology in [place] but he would like to establish locally.  Referral placed.  Esophageal stricture  -     omeprazole (priLOSEC) 40 MG capsule; Take 1 capsule by mouth Daily.  -     Ambulatory Referral to Gastroenterology  Patient was previously seen by gastroenterology in [place] but he would like to establish locally.  Referral placed.  Essential hypertension  -     lisinopril (PRINIVIL,ZESTRIL) 10 MG tablet; Take 1 tablet by mouth Daily.  -     amLODIPine (NORVASC) 2.5 MG tablet; Take 1 tablet by mouth Daily.  -     carvedilol (COREG) 6.25 MG tablet; Take 1 tablet by mouth 2 (Two) Times a Day With Meals.  Stable.  Continue current meds.  Dyslipidemia  -     atorvastatin (LIPITOR) 40 MG tablet; Take 1 tablet by mouth Every Night.  Check fasting labs today.  Continue high intensity statin due to ASCVD.  Chronic midline low back pain, with sciatica presence unspecified  -     meloxicam (MOBIC) 7.5 MG tablet; Take 1 tablet by mouth Daily As Needed for Moderate Pain .  New.  Start low-dose meloxicam as needed for  pain.  Coronary artery disease involving native heart without angina pectoris, unspecified vessel or lesion type  -     atorvastatin (LIPITOR) 40 MG tablet; Take 1 tablet by mouth Every Night.  -     carvedilol (COREG) 6.25 MG tablet; Take 1 tablet by mouth 2 (Two) Times a Day With Meals.  -     clopidogrel (PLAVIX) 75 MG tablet; Take 1 tablet by mouth Daily.  Asymptomatic.  Continue beta-blocker, statin, Plavix.  Leg cramps  -     Magnesium; Future  -     Magnesium  Check labs today.   Chronic fatigue  -     Vitamin B12; Future  -     TSH Rfx On Abnormal To Free T4; Future  -     CBC & Differential; Future  -     Magnesium; Future  -     Vitamin B12  -     TSH Rfx On Abnormal To Free T4  -     CBC & Differential  -     Magnesium  -     CBC Auto Differential  Check labs today.      Return in about 3 months (around 8/7/2019) for Follow-up.

## 2019-05-20 DIAGNOSIS — I10 ESSENTIAL HYPERTENSION: ICD-10-CM

## 2019-05-20 DIAGNOSIS — E11.42 TYPE 2 DIABETES MELLITUS WITH DIABETIC POLYNEUROPATHY, WITHOUT LONG-TERM CURRENT USE OF INSULIN (HCC): ICD-10-CM

## 2019-05-20 RX ORDER — LISINOPRIL 10 MG/1
TABLET ORAL
Qty: 90 TABLET | Refills: 0 | OUTPATIENT
Start: 2019-05-20

## 2019-05-21 ENCOUNTER — TELEPHONE (OUTPATIENT)
Dept: GASTROENTEROLOGY | Facility: CLINIC | Age: 64
End: 2019-05-21

## 2019-05-21 DIAGNOSIS — Z12.11 SCREENING FOR COLON CANCER: Primary | ICD-10-CM

## 2019-05-29 ENCOUNTER — TELEPHONE (OUTPATIENT)
Dept: GASTROENTEROLOGY | Facility: CLINIC | Age: 64
End: 2019-05-29

## 2019-05-29 DIAGNOSIS — R11.2 NAUSEA AND VOMITING, INTRACTABILITY OF VOMITING NOT SPECIFIED, UNSPECIFIED VOMITING TYPE: Primary | ICD-10-CM

## 2019-05-29 RX ORDER — ONDANSETRON 4 MG/1
4 TABLET, FILM COATED ORAL EVERY 6 HOURS PRN
Qty: 6 TABLET | Refills: 0 | Status: SHIPPED | OUTPATIENT
Start: 2019-05-29 | End: 2019-09-09

## 2019-05-31 ENCOUNTER — LAB REQUISITION (OUTPATIENT)
Dept: LAB | Facility: HOSPITAL | Age: 64
End: 2019-05-31

## 2019-05-31 ENCOUNTER — OUTSIDE FACILITY SERVICE (OUTPATIENT)
Dept: GASTROENTEROLOGY | Facility: CLINIC | Age: 64
End: 2019-05-31

## 2019-05-31 DIAGNOSIS — Z12.11 ENCOUNTER FOR SCREENING FOR MALIGNANT NEOPLASM OF COLON: ICD-10-CM

## 2019-05-31 PROCEDURE — 43249 ESOPH EGD DILATION <30 MM: CPT | Performed by: INTERNAL MEDICINE

## 2019-05-31 PROCEDURE — 88305 TISSUE EXAM BY PATHOLOGIST: CPT | Performed by: INTERNAL MEDICINE

## 2019-06-03 LAB
CYTO UR: NORMAL
LAB AP CASE REPORT: NORMAL
LAB AP CLINICAL INFORMATION: NORMAL
PATH REPORT.FINAL DX SPEC: NORMAL
PATH REPORT.GROSS SPEC: NORMAL

## 2019-06-14 ENCOUNTER — TELEPHONE (OUTPATIENT)
Dept: GASTROENTEROLOGY | Facility: CLINIC | Age: 64
End: 2019-06-14

## 2019-06-14 NOTE — TELEPHONE ENCOUNTER
PT CALLED STATING HE HAS HAD PURE DIARRHEA SINCE HIS COLONOSCOPY 2 WEEKS AGO. I DID ADVISE HIM I WOULD HAVE TO CONSULT WITH DR. NEGRO AND I WOULD RETURN CALL. PT VOICED UNDERSTANDING.

## 2019-06-14 NOTE — TELEPHONE ENCOUNTER
PER DR. NEGRO; THIS DIARRHEA ACTIVITIY CAN BE EXPECTED DUE TO POOR BOWEL PREP AND THIS IS JUST NOW STARTING TO BREAK UP TO MAKE HIS BOWELS MOVE. I RETURNED PT CALL AND ADVISED HIM OF DR. NEGRO PHONE CONSULT. PT VOICED UNDERSTANDING.

## 2019-08-06 ENCOUNTER — APPOINTMENT (OUTPATIENT)
Dept: LAB | Facility: HOSPITAL | Age: 64
End: 2019-08-06

## 2019-08-06 ENCOUNTER — TELEPHONE (OUTPATIENT)
Dept: FAMILY MEDICINE CLINIC | Facility: CLINIC | Age: 64
End: 2019-08-06

## 2019-08-06 ENCOUNTER — OFFICE VISIT (OUTPATIENT)
Dept: FAMILY MEDICINE CLINIC | Facility: CLINIC | Age: 64
End: 2019-08-06

## 2019-08-06 VITALS
OXYGEN SATURATION: 96 % | WEIGHT: 209.6 LBS | BODY MASS INDEX: 28.39 KG/M2 | HEIGHT: 72 IN | SYSTOLIC BLOOD PRESSURE: 116 MMHG | DIASTOLIC BLOOD PRESSURE: 66 MMHG | HEART RATE: 92 BPM

## 2019-08-06 DIAGNOSIS — R25.3 FASCICULATIONS: ICD-10-CM

## 2019-08-06 DIAGNOSIS — F33.1 MAJOR DEPRESSIVE DISORDER, RECURRENT EPISODE, MODERATE (HCC): ICD-10-CM

## 2019-08-06 DIAGNOSIS — R79.89 ELEVATED LFTS: ICD-10-CM

## 2019-08-06 DIAGNOSIS — E87.1 HYPONATREMIA: ICD-10-CM

## 2019-08-06 DIAGNOSIS — E11.42 TYPE 2 DIABETES MELLITUS WITH DIABETIC POLYNEUROPATHY, WITHOUT LONG-TERM CURRENT USE OF INSULIN (HCC): Primary | ICD-10-CM

## 2019-08-06 LAB
ALBUMIN SERPL-MCNC: 4.3 G/DL (ref 3.5–5.2)
ALBUMIN/GLOB SERPL: 1.3 G/DL
ALP SERPL-CCNC: 135 U/L (ref 39–117)
ALT SERPL W P-5'-P-CCNC: 57 U/L (ref 1–41)
ANION GAP SERPL CALCULATED.3IONS-SCNC: 14.2 MMOL/L (ref 5–15)
AST SERPL-CCNC: 52 U/L (ref 1–40)
BILIRUB SERPL-MCNC: 0.9 MG/DL (ref 0.2–1.2)
BUN BLD-MCNC: 13 MG/DL (ref 8–23)
BUN/CREAT SERPL: 16.3 (ref 7–25)
CALCIUM SPEC-SCNC: 9.6 MG/DL (ref 8.6–10.5)
CHLORIDE SERPL-SCNC: 99 MMOL/L (ref 98–107)
CO2 SERPL-SCNC: 25.8 MMOL/L (ref 22–29)
CREAT BLD-MCNC: 0.8 MG/DL (ref 0.76–1.27)
GFR SERPL CREATININE-BSD FRML MDRD: 97 ML/MIN/1.73
GLOBULIN UR ELPH-MCNC: 3.2 GM/DL
GLUCOSE BLD-MCNC: 207 MG/DL (ref 65–99)
GLUCOSE BLDC GLUCOMTR-MCNC: 223 MG/DL (ref 70–130)
HBA1C MFR BLD: 8.2 % (ref 4.8–5.6)
POTASSIUM BLD-SCNC: 4.3 MMOL/L (ref 3.5–5.2)
PROT SERPL-MCNC: 7.5 G/DL (ref 6–8.5)
SODIUM BLD-SCNC: 139 MMOL/L (ref 136–145)

## 2019-08-06 PROCEDURE — 83036 HEMOGLOBIN GLYCOSYLATED A1C: CPT | Performed by: FAMILY MEDICINE

## 2019-08-06 PROCEDURE — 82947 ASSAY GLUCOSE BLOOD QUANT: CPT | Performed by: FAMILY MEDICINE

## 2019-08-06 PROCEDURE — 99214 OFFICE O/P EST MOD 30 MIN: CPT | Performed by: FAMILY MEDICINE

## 2019-08-06 PROCEDURE — 80053 COMPREHEN METABOLIC PANEL: CPT | Performed by: FAMILY MEDICINE

## 2019-08-06 RX ORDER — GABAPENTIN 300 MG/1
300 CAPSULE ORAL 4 TIMES DAILY PRN
Qty: 360 CAPSULE | Refills: 0 | Status: SHIPPED | OUTPATIENT
Start: 2019-08-06 | End: 2019-11-25

## 2019-08-06 RX ORDER — ESCITALOPRAM OXALATE 10 MG/1
10 TABLET ORAL DAILY
Qty: 90 TABLET | Refills: 1 | Status: SHIPPED | OUTPATIENT
Start: 2019-08-06 | End: 2020-01-29

## 2019-08-06 NOTE — TELEPHONE ENCOUNTER
Please call [facility] pharmacy to cancel gabapentin prescription sent today.  Reviewing chart head showed gabapentin was last prescribed in May however the Isaiah shows he did not fill the prescription until 7/29/2019.  He is not due for gabapentin refill today therefore canceled the gabapentin sent on 8/6/2019.

## 2019-08-06 NOTE — PROGRESS NOTES
Chief Complaint   Patient presents with   • Diabetes   • Depression     would like to switch depression medication, states that sertraline isn't helping        Diabetes   He presents for his follow-up diabetic visit. He has type 2 diabetes mellitus. Associated symptoms include fatigue and foot paresthesias. Pertinent negatives for diabetes include no foot ulcerations. Current diabetic treatment includes oral agent (dual therapy) (GLP-1a). He is following a generally unhealthy diet. When asked about meal planning, he reported none. An ACE inhibitor/angiotensin II receptor blocker is being taken.   Depression   Visit Type: follow-up  Patient presents with the following symptoms: anhedonia, decreased concentration and fatigue.  Patient is not experiencing: depressed mood and suicidal ideas.       PHQ-2/PHQ-9 Depression Screening 8/6/2019   Little interest or pleasure in doing things 3   Feeling down, depressed, or hopeless 0   Trouble falling or staying asleep, or sleeping too much 3   Feeling tired or having little energy 3   Poor appetite or overeating 3   Feeling bad about yourself - or that you are a failure or have let yourself or your family down 1   Trouble concentrating on things, such as reading the newspaper or watching television 1   Moving or speaking so slowly that other people could have noticed. Or the opposite - being so fidgety or restless that you have been moving around a lot more than usual 0   Thoughts that you would be better off dead, or of hurting yourself in some way 0   Total Score 14   If you checked off any problems, how difficult have these problems made it for you to do your work, take care of things at home, or get along with other people? Somewhat difficult     Likes too many candy bars. Taking gabapentin 3-4 times a day helping. Numbness in hands. Tingling and burning in right foot. No ulcers on feet, checks feet when he puts on his shoes.    Interested in trying lexapro. He falls  asleep, sitting on couch.     Has twitching on bilateral lower legs when he crosses his legs. Denies pain with it.         Review of Systems   Constitutional: Positive for appetite change and fatigue.   Neurological: Positive for numbness.   Psychiatric/Behavioral: Positive for decreased concentration, dysphoric mood and sleep disturbance. Negative for suicidal ideas and depressed mood.    ROYCE Fall Risk Clinician Key Questions   Have you fallen in the past year?: Yes    Stay Idependant Patient Questions   Patient Fall Risk Assessment Score : 0        Current Outpatient Medications on File Prior to Visit   Medication Sig Dispense Refill   • amLODIPine (NORVASC) 2.5 MG tablet Take 1 tablet by mouth Daily. 90 tablet 1   • aspirin 81 MG EC tablet Take 1 tablet by mouth Daily.     • atorvastatin (LIPITOR) 40 MG tablet Take 1 tablet by mouth Every Night. 90 tablet 1   • carvedilol (COREG) 6.25 MG tablet Take 1 tablet by mouth 2 (Two) Times a Day With Meals. 180 tablet 1   • clopidogrel (PLAVIX) 75 MG tablet Take 1 tablet by mouth Daily. 90 tablet 1   • Dapagliflozin Propanediol (FARXIGA) 10 MG tablet Take 10 mg by mouth Daily. 90 tablet 1   • Evolocumab (REPATHA SURECLICK) 140 MG/ML solution auto-injector Inject  under the skin.     • ferrous sulfate 325 (65 FE) MG tablet Take 325 mg by mouth.     • glucose blood test strip Use to test blood sugars once daily. 50 each 11   • hydrocortisone 0.5 % cream Apply to affected area 2x/day for max of 2 weeks 56 g 0   • ketoconazole (NIZORAL) 2 % shampoo Lather and apply to face, leave for 5 minutes, then rinse daily in the shower 200 mL 0   • lisinopril (PRINIVIL,ZESTRIL) 10 MG tablet Take 1 tablet by mouth Daily. 90 tablet 1   • melatonin 3 MG tablet Take 10 mg by mouth.     • meloxicam (MOBIC) 7.5 MG tablet Take 1 tablet by mouth Daily As Needed for Moderate Pain . 90 tablet 1   • metFORMIN (GLUCOPHAGE) 1000 MG tablet Take 1 tablet by mouth 2 (Two) Times a Day With Meals. 180  tablet 3   • nitroglycerin (NITROSTAT) 0.4 MG SL tablet      • omeprazole (priLOSEC) 40 MG capsule Take 1 capsule by mouth Daily. 90 capsule 1   • ondansetron (ZOFRAN) 4 MG tablet Take 1 tablet by mouth Every 6 (Six) Hours As Needed (FOR COLONOSCOPY  PREP). 6 tablet 0   • Semaglutide (OZEMPIC) 1 MG/DOSE solution pen-injector Inject 1 mg under the skin into the appropriate area as directed 1 (One) Time Per Week. 4 pen 5   • [DISCONTINUED] gabapentin (NEURONTIN) 300 MG capsule Take 1 capsule by mouth 4 (Four) Times a Day. 360 capsule 0   • [DISCONTINUED] sertraline (ZOLOFT) 25 MG tablet Take 1 tablet by mouth Daily. 90 tablet 1   • [DISCONTINUED] Sod Picosulfate-Mag Ox-Cit Acd 10-3.5-12 MG-GM -GM/160ML solution Take 1 kit by mouth Take As Directed. Follow instructions that were mailed to your home. If you didn't receive these call [phone]. 2 bottle 0     No current facility-administered medications on file prior to visit.        No Known Allergies    Past Medical History:   Diagnosis Date   • A-fib (CMS/HCC)    • Adenomatous colon polyp    • Allergic    • Anemia    • Atrial flutter (CMS/HCC)    • Carpal tunnel syndrome    • Cervicalgia    • Coronary artery disease    • Depression    • Dyslipidemia    • Esophageal stricture    • GERD (gastroesophageal reflux disease)    • History of gout    • Hyperlipidemia    • Hypertension    • Hypogonadism male    • Left ventricular hypertrophy    • Low back pain    • Migraines    • Nephrolithiasis    • Obesity    • Osteoarthritis    • Peripheral neuropathy    • Tubular adenoma of colon 05/31/2019   • Type 2 diabetes mellitus (CMS/HCC)         Past Surgical History:   Procedure Laterality Date   • APPENDECTOMY     • CARDIAC ABLATION     • CORONARY ANGIOPLASTY     • CORONARY STENT PLACEMENT     • ESOPHAGEAL DILATATION     • TONSILLECTOMY          Family History   Problem Relation Age of Onset   • Diabetes Mother    • Heart disease Mother    • Heart disease Father      "    Social History     Socioeconomic History   • Marital status: Other     Spouse name: Not on file   • Number of children: 2   • Years of education: Not on file   • Highest education level: Not on file   Occupational History   • Occupation: insurance businessman   Tobacco Use   • Smoking status: Never Smoker   • Smokeless tobacco: Never Used   Substance and Sexual Activity   • Alcohol use: Yes     Comment: occ   • Drug use: No   Social History Narrative    11/17:    Working part time.    .    2 kids.    Lives alone.    Exercise: walks a lot at work 2d/wk    Dental: due    Eye: glasses, within last 2yrs        Visit Vitals  /66 (BP Location: Left arm, Patient Position: Sitting, Cuff Size: Adult)   Pulse 92   Ht 182.9 cm (72\")   Wt 95.1 kg (209 lb 9.6 oz)   SpO2 96%   BMI 28.43 kg/m²        Physical Exam   Constitutional: No distress. He is obese.  Cardiovascular: Normal rate and regular rhythm.   No murmur heard.  Pulmonary/Chest: Effort normal and breath sounds normal.   Musculoskeletal: He exhibits no edema.   Neurological:   Fasciculations bilateral calves with crossed legs   Psychiatric: He exhibits a depressed mood.   irritable   Vitals reviewed.    Results for orders placed or performed in visit on 08/06/19   POC Glucose   Result Value Ref Range    Glucose 223 (A) 70 - 130 mg/dL     CMP:  Lab Results   Component Value Date    BUN 15 05/07/2019    CREATININE 0.82 05/07/2019    EGFRIFNONA 94 05/07/2019    BCR 18.3 05/07/2019     (L) 05/07/2019    K 4.6 05/07/2019    CO2 24.1 05/07/2019    CALCIUM 9.5 05/07/2019    ALBUMIN 4.50 05/07/2019    BILITOT 0.8 05/07/2019    ALKPHOS 160 (H) 05/07/2019    AST 55 (H) 05/07/2019    ALT 49 (H) 05/07/2019     Lipid Panel:  Lab Results   Component Value Date    CHOL 111 05/07/2019    TRIG 155 (H) 05/07/2019    HDL 48 05/07/2019    VLDL 31 05/07/2019    LDL 32 05/07/2019     HbA1c:  Lab Results   Component Value Date    HGBA1C 8.3 05/07/2019    HGBA1C 8.9 " 02/08/2019     Microalbumin:  Lab Results   Component Value Date    MICROALBUR 1.4 05/07/2019                  Bryan was seen today for diabetes and depression.    Diagnoses and all orders for this visit:    Type 2 diabetes mellitus with diabetic polyneuropathy, without long-term current use of insulin (CMS/[facility])  -     Cancel: POC Glycosylated Hemoglobin (Hb A1C)  -     POC Glucose  -     Comprehensive Metabolic Panel; Future  -     Hemoglobin A1c; Future  -     gabapentin (NEURONTIN) 300 MG capsule; Take 1 capsule by mouth 4 (Four) Times a Day As Needed (neuropathy).  -     Comprehensive Metabolic Panel  -     Hemoglobin A1c  Unable to read POCT A1c, sent to the lab.   Non-compliant with diet.   Adjust meds based on A1c result.   Major depressive disorder, recurrent episode, moderate (CMS/[facility])  -     escitalopram (LEXAPRO) 10 MG tablet; Take 1 tablet by mouth Daily.  Uncontrolled. Switch from sertraline to lexapro.   Fasciculations  -     Ambulatory Referral to Neurology    Hyponatremia  -     Comprehensive Metabolic Panel; Future  -     Comprehensive Metabolic Panel  Recheck labs today.   Elevated LFTs  -     Comprehensive Metabolic Panel; Future  -     Comprehensive Metabolic Panel  Recheck labs today.       Return for [employer] [facility] before Oct 1.

## 2019-08-07 ENCOUNTER — TELEPHONE (OUTPATIENT)
Dept: FAMILY MEDICINE CLINIC | Facility: CLINIC | Age: 64
End: 2019-08-07

## 2019-08-07 NOTE — TELEPHONE ENCOUNTER
----- Message from Erika Landrum MD sent at 8/7/2019  9:43 AM EDT -----  Fatty liver also can cause elevation in liver enzymes. Avoid alcohol and we can recheck enzymes at next visit.     EDS  ----- Message -----  From: Chely Santos MA  Sent: 8/7/2019   8:23 AM  To: Erika Landrum MD    Spoke with pt and notified him of lab results. He stated at this time he would prefer not to take insulin, but would rather try cutting out chocolates first to see if it helps. He also wanted to know what might be causing his liver enzymes to remain elevated since he has decreased his alcohol intake.

## 2019-08-16 ENCOUNTER — TELEPHONE (OUTPATIENT)
Dept: FAMILY MEDICINE CLINIC | Facility: CLINIC | Age: 64
End: 2019-08-16

## 2019-08-16 NOTE — TELEPHONE ENCOUNTER
PER PT CALLED, WOULD LIKE TO KNOW THE STATUS OF THE NEUROLOGY REFERRAL. PLEASE CALL BACK TO ADVISE.

## 2019-09-04 ENCOUNTER — CONSULT (OUTPATIENT)
Dept: SLEEP MEDICINE | Facility: HOSPITAL | Age: 64
End: 2019-09-04

## 2019-09-04 ENCOUNTER — LAB (OUTPATIENT)
Dept: LAB | Facility: HOSPITAL | Age: 64
End: 2019-09-04

## 2019-09-04 ENCOUNTER — OFFICE VISIT (OUTPATIENT)
Dept: NEUROLOGY | Facility: CLINIC | Age: 64
End: 2019-09-04

## 2019-09-04 VITALS
HEART RATE: 85 BPM | SYSTOLIC BLOOD PRESSURE: 118 MMHG | WEIGHT: 208 LBS | HEIGHT: 73 IN | DIASTOLIC BLOOD PRESSURE: 70 MMHG | OXYGEN SATURATION: 95 % | BODY MASS INDEX: 27.57 KG/M2

## 2019-09-04 VITALS
HEART RATE: 92 BPM | DIASTOLIC BLOOD PRESSURE: 68 MMHG | SYSTOLIC BLOOD PRESSURE: 107 MMHG | HEIGHT: 73 IN | OXYGEN SATURATION: 96 % | BODY MASS INDEX: 27.57 KG/M2 | WEIGHT: 208 LBS

## 2019-09-04 DIAGNOSIS — R25.3 FASCICULATIONS: ICD-10-CM

## 2019-09-04 DIAGNOSIS — R41.3 MEMORY LOSS: ICD-10-CM

## 2019-09-04 DIAGNOSIS — Z98.890 HISTORY OF RADIOFREQUENCY ABLATION (RFA) PROCEDURE FOR CARDIAC ARRHYTHMIA: ICD-10-CM

## 2019-09-04 DIAGNOSIS — G47.19 EXCESSIVE DAYTIME SLEEPINESS: Primary | ICD-10-CM

## 2019-09-04 DIAGNOSIS — R06.83 SNORING: Primary | ICD-10-CM

## 2019-09-04 DIAGNOSIS — E66.09 CLASS 1 OBESITY DUE TO EXCESS CALORIES WITHOUT SERIOUS COMORBIDITY IN ADULT, UNSPECIFIED BMI: ICD-10-CM

## 2019-09-04 DIAGNOSIS — G47.33 OBSTRUCTIVE SLEEP APNEA, ADULT: ICD-10-CM

## 2019-09-04 DIAGNOSIS — I10 ESSENTIAL HYPERTENSION: ICD-10-CM

## 2019-09-04 LAB
CK MB SERPL-CCNC: 4.33 NG/ML
CK SERPL-CCNC: 136 U/L (ref 20–200)
MAGNESIUM SERPL-MCNC: 2 MG/DL (ref 1.6–2.4)
PHOSPHATE SERPL-MCNC: 3.5 MG/DL (ref 2.5–4.5)

## 2019-09-04 PROCEDURE — 83735 ASSAY OF MAGNESIUM: CPT

## 2019-09-04 PROCEDURE — 36415 COLL VENOUS BLD VENIPUNCTURE: CPT

## 2019-09-04 PROCEDURE — 84100 ASSAY OF PHOSPHORUS: CPT

## 2019-09-04 PROCEDURE — 99204 OFFICE O/P NEW MOD 45 MIN: CPT | Performed by: NURSE PRACTITIONER

## 2019-09-04 PROCEDURE — 99204 OFFICE O/P NEW MOD 45 MIN: CPT | Performed by: INTERNAL MEDICINE

## 2019-09-04 PROCEDURE — 82550 ASSAY OF CK (CPK): CPT

## 2019-09-04 PROCEDURE — 82553 CREATINE MB FRACTION: CPT

## 2019-09-04 RX ORDER — CETIRIZINE HYDROCHLORIDE 10 MG/1
10 TABLET ORAL DAILY
COMMUNITY
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
## 2019-09-05 ENCOUNTER — TELEPHONE (OUTPATIENT)
Dept: NEUROLOGY | Facility: CLINIC | Age: 64
End: 2019-09-05

## 2019-09-05 NOTE — TELEPHONE ENCOUNTER
----- Message from PAUL Hope sent at 9/5/2019  8:29 AM EDT -----  Labs are all normal. Muscle enzymes are normal. Magnesium and Phosphorous levels are normal. Thanks, PAUL Hong

## 2019-09-05 NOTE — PROGRESS NOTES
Subjective   Bryan Oswald is a 63 y.o. male is being seen for consultation today at the request of CHAPITO Hope for the evaluation of difficulties getting to sleep and staying asleep.  His primary care physician is Dr. Mary Landrum.    History of Present Illness  Patient states he was seen Ms. Villalobos regarding tremors.  She obtained a history of sleeping problems and he is referred here.  He states that sleeping problems for at least 5 years and awakens frequently at night.  He says he never seems to sleep very long at night and awakens 2- 3 times during the night.  He often has problems getting back to sleep.  He says he did sleep 8 hours last evening but feels still tired today.  He has had snoring noted since 1984.  He denies being told he had apneas.  He denies waking gasping for breath.  He says he is not rested in the morning.  He denies having morning headaches.  He will fall asleep to sitting quietly during the day.  He denies problems while driving.    He thinks he has loud snoring and is unsure if its in all positions.  He has awaken with a dry mouth.  He denies ever breaking his nose.  He has a history of reflux and is on medications.  He denies hypnagogic hallucinations sleep paralysis.  He denies kicking or jerking his legs at night.  He has chronic back pain.  He says he is lost about 40 pounds with effort this year.  He has a history of concussions and 1992 in 1970.    He states he arises between 530 and 7 in the morning.  He will have breakfast and clean up.  He either goes to work which he does couple days a week or reads.  He has lunch between noon and 3.  He will then read or watch TV in the afternoon.  He has dinner 4 and 5.  He then watches TV 9 or 1030.  He takes 10 mg melatonin and goes to sleep in about 20 minutes he says.  He awakens 2 or 3 times during the night.  He thinks he gets 7 to 8 hours of sleep but is still tired.  He has had a history of diabetes known since  1988.  He has had hypertension known for about 20 years.  He had an MI in 1988 and had 3 stents.  He has had atrial fibrillation about 5 years ago and had a cardioversion in the had a recurrence with an ablation 3 years ago.      No Known Allergies       Current Outpatient Medications:   •  amLODIPine (NORVASC) 2.5 MG tablet, Take 1 tablet by mouth Daily., Disp: 90 tablet, Rfl: 1  •  aspirin 81 MG EC tablet, Take 1 tablet by mouth Daily., Disp: , Rfl:   •  atorvastatin (LIPITOR) 40 MG tablet, Take 1 tablet by mouth Every Night., Disp: 90 tablet, Rfl: 1  •  carvedilol (COREG) 6.25 MG tablet, Take 1 tablet by mouth 2 (Two) Times a Day With Meals., Disp: 180 tablet, Rfl: 1  •  cetirizine (zyrTEC) 10 MG tablet, Take 10 mg by mouth Daily., Disp: , Rfl:   •  clopidogrel (PLAVIX) 75 MG tablet, Take 1 tablet by mouth Daily., Disp: 90 tablet, Rfl: 1  •  Dapagliflozin Propanediol (FARXIGA) 10 MG tablet, Take 10 mg by mouth Daily., Disp: 90 tablet, Rfl: 1  •  escitalopram (LEXAPRO) 10 MG tablet, Take 1 tablet by mouth Daily., Disp: 90 tablet, Rfl: 1  •  Evolocumab (REPATHA SURECLICK) 140 MG/ML solution auto-injector, Inject  under the skin., Disp: , Rfl:   •  ferrous sulfate 325 (65 FE) MG tablet, Take 325 mg by mouth., Disp: , Rfl:   •  gabapentin (NEURONTIN) 300 MG capsule, Take 1 capsule by mouth 4 (Four) Times a Day As Needed (neuropathy)., Disp: 360 capsule, Rfl: 0  •  glucose blood test strip, Use to test blood sugars once daily., Disp: 50 each, Rfl: 11  •  hydrocortisone 0.5 % cream, Apply to affected area 2x/day for max of 2 weeks, Disp: 56 g, Rfl: 0  •  ketoconazole (NIZORAL) 2 % shampoo, Lather and apply to face, leave for 5 minutes, then rinse daily in the shower, Disp: 200 mL, Rfl: 0  •  lisinopril (PRINIVIL,ZESTRIL) 10 MG tablet, Take 1 tablet by mouth Daily., Disp: 90 tablet, Rfl: 1  •  melatonin 3 MG tablet, Take 10 mg by mouth., Disp: , Rfl:   •  meloxicam (MOBIC) 7.5 MG tablet, Take 1 tablet by mouth Daily As  Needed for Moderate Pain ., Disp: 90 tablet, Rfl: 1  •  metFORMIN (GLUCOPHAGE) 1000 MG tablet, Take 1 tablet by mouth 2 (Two) Times a Day With Meals., Disp: 180 tablet, Rfl: 3  •  nitroglycerin (NITROSTAT) 0.4 MG SL tablet, , Disp: , Rfl:   •  omeprazole (priLOSEC) 40 MG capsule, Take 1 capsule by mouth Daily., Disp: 90 capsule, Rfl: 1  •  Semaglutide (OZEMPIC) 1 MG/DOSE solution pen-injector, Inject 1 mg under the skin into the appropriate area as directed 1 (One) Time Per Week., Disp: 4 pen, Rfl: 5  •  ondansetron (ZOFRAN) 4 MG tablet, Take 1 tablet by mouth Every 6 (Six) Hours As Needed (FOR COLONOSCOPY  PREP)., Disp: 6 tablet, Rfl: 0    Social History    Tobacco Use      Smoking status: Never Smoker      Smokeless tobacco: Never Used       Social History     Substance and Sexual Activity   Alcohol Use Yes   • Types: 2 Cans of beer per week    Comment: occ       Caffeine: He drinks 6-7 jorje per day    Past Medical History:   Diagnosis Date   • A-fib (CMS/HCC)    • Adenomatous colon polyp    • Allergic    • Anemia    • Atrial flutter (CMS/HCC)    • Carpal tunnel syndrome    • Cervicalgia    • Coronary artery disease    • Depression    • Dyslipidemia    • Esophageal stricture    • GERD (gastroesophageal reflux disease)    • Headache, tension-type sometimes   • History of gout    • HL (hearing loss) last few years   • Hyperlipidemia    • Hypertension    • Hypogonadism male    • Left ventricular hypertrophy    • Low back pain    • Memory loss 2-3 years    forgetful   • Migraines    • Nephrolithiasis    • Neuropathy in diabetes (CMS/HCC) 3 years   • Obesity    • Osteoarthritis    • Peripheral neuropathy    • Sleep apnea i snore a lot   • Tubular adenoma of colon 05/31/2019   • Type 2 diabetes mellitus (CMS/HCC)    • Vision loss constant       Past Surgical History:   Procedure Laterality Date   • APPENDECTOMY     • CARDIAC ABLATION     • CAROTID STENT     • CORONARY ANGIOPLASTY     • CORONARY STENT PLACEMENT     •  "ESOPHAGEAL DILATATION     • TONSILLECTOMY         Family History   Problem Relation Age of Onset   • Diabetes Mother    • Heart disease Mother    • Hypertension Mother    • Thyroid disease Mother    • Heart disease Father    • Dystonia Son    • Cancer Sister         SPINAL CORD   • Sleep apnea Daughter        The following portions of the patient's history were reviewed and updated as appropriate: allergies, current medications, past family history, past medical history, past social history, past surgical history and problem list.    Review of Systems   Constitutional: Positive for fatigue and unexpected weight change.   HENT: Positive for hearing loss and tinnitus.    Eyes: Negative.    Respiratory: Negative.    Cardiovascular: Positive for chest pain.   Gastrointestinal: Positive for constipation.        He complains of change in appetite and change in bowel movement   Endocrine: Positive for polydipsia and polyuria.   Genitourinary: Negative.    Musculoskeletal: Positive for arthralgias, back pain and myalgias.   Skin: Negative.    Allergic/Immunologic: Negative.    Neurological: Positive for dizziness, tremors, light-headedness and numbness.   Psychiatric/Behavioral: Positive for confusion, decreased concentration and dysphoric mood.   Patient is a history of tinnitus as well.    [place] score is 11/24    Objective     /68   Pulse 92   Ht 185.4 cm (73\")   Wt 94.3 kg (208 lb)   SpO2 96%   BMI 27.44 kg/m²      Physical Exam   Constitutional: He is oriented to person, place, and time. He appears well-developed and well-nourished.   He is overweight.   HENT:   Head: Normocephalic and atraumatic.   He has Mallampati class IV anatomy.   Eyes: EOM are normal. Pupils are equal, round, and reactive to light.   Neck: Normal range of motion. Neck supple.   Cardiovascular: Normal rate, regular rhythm and normal heart sounds.   Pulmonary/Chest: Effort normal and breath sounds normal.   Abdominal: Soft. Bowel " sounds are normal.   Musculoskeletal: Normal range of motion. He exhibits no edema.   Neurological: He is alert and oriented to person, place, and time.   Skin: Skin is warm and dry.   Psychiatric: He has a normal mood and affect. His behavior is normal.         Assessment/Plan   Bryan was seen today for sleeping problem.    Diagnoses and all orders for this visit:    Snoring  -     Home Sleep Study; Future    Obstructive sleep apnea, adult  -     Home Sleep Study; Future    Class 1 obesity due to excess calories without serious comorbidity in adult, unspecified BMI    Patient presents to history as noted above.  He has some snoring and nonrestorative sleep and is a fairly good story for obstructive sleep apnea.  We will plan to proceed to home sleep testing.  We have discussed possible therapies and including CPAP, weight control, oral appliance, and surgery.  We have also discussed the long-term consequences of untreated obstructive sleep apnea.  He is to return in after study.  He is encouraged to lose weight.  He is encouraged to avoid alcohol and sedatives close to bedtime.  He is encouraged to practice lateral position sleep.    He also seems to have some psychophysiologic insomnia.  We discussed measures to improve sleep hygiene.  We have also given some samples of sustained-release melatonin see if it works better than his rapid release melatonin.  We will evaluate this also on his return.  He is to try to cut out his caffeine by midafternoon.         Cristian Doran MD [facility]  Sleep Medicine  Pulmonary and Critical Care Medicine

## 2019-09-05 NOTE — PROGRESS NOTES
Labs are all normal. Muscle enzymes are normal. Magnesium and Phosphorous levels are normal. Thanks, PAUL Hong

## 2019-09-06 ENCOUNTER — TELEPHONE (OUTPATIENT)
Dept: NEUROLOGY | Facility: CLINIC | Age: 64
End: 2019-09-06

## 2019-09-06 NOTE — TELEPHONE ENCOUNTER
----- Message from PALU Hope sent at 9/5/2019  8:29 AM EDT -----  Labs are all normal. Muscle enzymes are normal. Magnesium and Phosphorous levels are normal. Thanks, PAUL Hong

## 2019-09-09 ENCOUNTER — HOSPITAL ENCOUNTER (OUTPATIENT)
Dept: SLEEP MEDICINE | Facility: HOSPITAL | Age: 64
Discharge: HOME | End: 2019-09-09
Admitting: INTERNAL MEDICINE

## 2019-09-09 VITALS
SYSTOLIC BLOOD PRESSURE: 109 MMHG | BODY MASS INDEX: 28.71 KG/M2 | DIASTOLIC BLOOD PRESSURE: 70 MMHG | HEART RATE: 84 BPM | OXYGEN SATURATION: 96 % | RESPIRATION RATE: 16 BRPM | HEIGHT: 73 IN | WEIGHT: 216.6 LBS

## 2019-09-09 DIAGNOSIS — R06.83 SNORING: ICD-10-CM

## 2019-09-09 DIAGNOSIS — G47.33 OBSTRUCTIVE SLEEP APNEA, ADULT: ICD-10-CM

## 2019-09-09 PROCEDURE — 95806 SLEEP STUDY UNATT&RESP EFFT: CPT | Performed by: INTERNAL MEDICINE

## 2019-09-09 PROCEDURE — 95806 SLEEP STUDY UNATT&RESP EFFT: CPT

## 2019-09-10 DIAGNOSIS — I10 ESSENTIAL HYPERTENSION: ICD-10-CM

## 2019-09-10 DIAGNOSIS — G47.33 OSA (OBSTRUCTIVE SLEEP APNEA): Primary | ICD-10-CM

## 2019-09-10 DIAGNOSIS — R06.83 SNORING: ICD-10-CM

## 2019-09-10 NOTE — PROGRESS NOTES
Subjective:     Encounter Date:09/13/2019    Patient ID: Bryan Oswald is a 63 y.o.  white male from [place], [place], now living predominantly in [place], [place], .    REFERRING PROVIDER: PAUL Mueller  PHYSICIAN: Erika Hernandez MD  REMOTE ELECTROPHYSIOLOGIST: Joseph Buchanan MD  REMOTE CARDIOLOGIST: Santhosh Rosenbaum MD, [facility]  NEUROLOGIST/SLEEP PHYSICIAN: Cristian Doran MD, [facility] INTERVENTIONALIST: Jones Marroquni MD    Chief Complaint:   Chief Complaint   Patient presents with   • Shortness of Breath     Problem List:  1. Atrial flutter:  a.  Unknown onset, noted incidentally at time of office visit, March 2014.   b. CHADS score of 2.   c. Status post ASYA-guided cardioversion; 03/11/2014: EF 0.50-0.55, no thrombus in the JASS, 175 J biphasic shock with conversion to sinus rhythm.  d. Recurrence of persistent atrial flutter, 10/18/2015.  e. Typical isthmus-dependent counterclockwise right atrial flutter with termination with radiofrequency ablation, 11/04/2015   f. Echocardiogram, 1/25/2017: Technically difficult parasternal views due to vertical images.  LV normal in size.  Normal LV wall thickness.  Visually estimated EF 0.65.  Abnormal systolic strain pattern.  Normal diastolic function.  No significant valvular heart disease  g. No documented recent recurrence  2. Chronic ischemic heart disease:  a. History of NSTEMI, March 2008.  b.  Status post proximal LAD stent, data deficit.    c. Taxus LALITO to OM-1 and OM-3 on 04/04/2008.  d. Left heart catheterization, 1/25/2017: Successful PCI of the obtuse marginal 1 in-stent restenosis with one drug-eluting stent.  Final kissing balloon inflation of the obtuse marginal artery as well as mid circumflex due to bifurcating lesion with good vessels.  Severe diffuse disease involving the small distal vessels of all epicardial arteries likely due to underlying diabetic status which is not good interventional which  should be managed medically.  PCI not feasible for the small distal arteries I think if there is a recurrence of proximal epicardial vessels in the future he may be considered for bypass but would definitely have poor distal targets.  Optimal medical therapy at this point in time  e. Residual class I symptoms with acceptable EKG, September 2019  3. Hypertension.   4. Dyslipidemia.   5. Type 2 diabetes mellitus; hemoglobin A1c 8.2%, August 2019 with peripheral neuropathy.  6. Obesity.   7. GERD.   8. Probable obstructive sleep apnea with chronic daytime sleepiness and recent abnormal outpatient nocturnal oximetry screening study.  9. History of gout.   10. Depression.  11. History of esophageal stricture.  12. Leg and hand fasciculations with nerve conduction testing, September 2019, demonstrating peripheral neuropathy, axonal, moderate nerve damage to left wrist and left elbow.  No ALS.  Felt that neuropathy secondary to diabetes  13. Short-term memory loss  14. Surgical history:   a. Status post appendectomy.   b. Status post renal stone removal and renal stent placement.  c. Carpal  tunnel release.  d. Tonsillectomy.  e. Flutter ablation  f. [facility]    No Known Allergies      Current Outpatient Medications:   •  amLODIPine (NORVASC) 2.5 MG tablet, Take 1 tablet by mouth Daily., Disp: 90 tablet, Rfl: 1  •  aspirin 81 MG EC tablet, Take 1 tablet by mouth Daily., Disp: , Rfl:   •  atorvastatin (LIPITOR) 40 MG tablet, Take 1 tablet by mouth Every Night., Disp: 90 tablet, Rfl: 1  •  carvedilol (COREG) 6.25 MG tablet, Take 1 tablet by mouth 2 (Two) Times a Day With Meals., Disp: 180 tablet, Rfl: 1  •  cetirizine (zyrTEC) 10 MG tablet, Take 10 mg by mouth Daily., Disp: , Rfl:   •  clopidogrel (PLAVIX) 75 MG tablet, Take 1 tablet by mouth Daily., Disp: 90 tablet, Rfl: 1  •  Dapagliflozin Propanediol (FARXIGA) 10 MG tablet, Take 10 mg by mouth Daily., Disp: 90 tablet, Rfl: 1  •  escitalopram (LEXAPRO) 10 MG tablet, Take 1  tablet by mouth Daily., Disp: 90 tablet, Rfl: 1  •  Evolocumab (REPATHA SURECLICK) 140 MG/ML solution auto-injector, Inject  under the skin., Disp: , Rfl:   •  ferrous sulfate 325 (65 FE) MG tablet, Take 325 mg by mouth., Disp: , Rfl:   •  gabapentin (NEURONTIN) 300 MG capsule, Take 1 capsule by mouth 4 (Four) Times a Day As Needed (neuropathy)., Disp: 360 capsule, Rfl: 0  •  glucose blood test strip, Use to test blood sugars once daily., Disp: 50 each, Rfl: 11  •  lisinopril (PRINIVIL,ZESTRIL) 10 MG tablet, Take 1 tablet by mouth Daily., Disp: 90 tablet, Rfl: 1  •  melatonin 3 MG tablet, Take 10 mg by mouth., Disp: , Rfl:   •  meloxicam (MOBIC) 7.5 MG tablet, Take 1 tablet by mouth Daily As Needed for Moderate Pain ., Disp: 90 tablet, Rfl: 1  •  metFORMIN (GLUCOPHAGE) 1000 MG tablet, Take 1 tablet by mouth 2 (Two) Times a Day With Meals., Disp: 180 tablet, Rfl: 3  •  nitroglycerin (NITROSTAT) 0.4 MG SL tablet, , Disp: , Rfl:   •  omeprazole (priLOSEC) 40 MG capsule, Take 1 capsule by mouth Daily., Disp: 90 capsule, Rfl: 1  •  Semaglutide (OZEMPIC) 1 MG/DOSE solution pen-injector, Inject 1 mg under the skin into the appropriate area as directed 1 (One) Time Per Week., Disp: 4 pen, Rfl: 5    History of Present Illness  This is a 63-year-old white [facility] and was formerly seen by Dr. Rosenbaum and Dr. Buchanan.  He denies any chest pressure, palpitations, dizziness, presyncope, syncope, or edema.  He is being followed by neurology currently for fasciculations in his lower extremities.  He underwent nerve conduction testing 2 days ago and is scheduled to follow-up next week to find the results.  He also did a home sleep study this week, and he will also be learning those results next week.  He has had dyslipidemia in the past, but his lipid panel has been acceptable since he has been on Repatha.  He was a part of a research study and now gets his Repatha free.  He is not overly  active, other than walking during his part-time job at [employer].  He also volunteers at the [employer] [facility] [employer].  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  Patient occasionally has shortness of breath if he is going up a flight of stairs but otherwise denies any cardiopulmonary complaints.  He has not had any recent testing, other than the neurologic workup and a few laboratory studies.  He denies any hospitalizations, surgeries, or new diagnoses.  He is not smoking.  Patient otherwise denies chest pain, severe shortness of breath, PND, edema, palpitations, syncope or presyncope at this time.    Cardiovascular Disease Risk Factors  hyptertension, hyperlipidemia, diabetes mellitus, increased age, male gender    Social History     Socioeconomic History   • Marital status: Other     Spouse name: Not on file   • Number of children: 2   • Years of education: Not on file   • Highest education level: Not on file   Occupational History   • Occupation: insurance businessman   Tobacco Use   • Smoking status: Never Smoker   • Smokeless tobacco: Never Used   Substance and Sexual Activity   • Alcohol use: Yes     Types: 2 Cans of beer per week     Comment: occ   • Drug use: No   • Sexual activity: Defer     Partners: Female     Birth control/protection: Abstinence   Social History Narrative    11/17:    Working part time.    .    2 kids.    Lives alone.    Exercise: walks a lot at work 2d/wk    Dental: due    Eye: glasses, within last 2yrs       Family History   Problem Relation Age of Onset   • Diabetes Mother    • Heart disease Mother    • Hypertension Mother    • Thyroid disease Mother    • Heart disease Father    • Dystonia Son    • Cancer Sister         SPINAL CORD   • Sleep apnea Daughter        Review of Systems   Constitution: Positive for malaise/fatigue.   HENT: Positive for hearing loss.    Eyes: Negative.    Cardiovascular: Positive for dyspnea on exertion.  "Negative for chest pain, claudication, irregular heartbeat, leg swelling, near-syncope, orthopnea, palpitations, paroxysmal nocturnal dyspnea and syncope.   Respiratory: Positive for sleep disturbances due to breathing and snoring. Negative for shortness of breath.    Endocrine:        Hemoglobin A1c 8.2% August 2019   Skin: Positive for skin cancer.   Musculoskeletal: Positive for back pain.   Gastrointestinal: Negative.    Genitourinary: Positive for frequency.   Neurological: Positive for paresthesias and tremors.        Fasciculations in lower extremities, recently ruled out for ALS   Psychiatric/Behavioral: Negative.    Allergic/Immunologic: Negative.       Obtained and negative except as outlined in problem list and HPI.      ECG 12 Lead  Date/Time: 9/13/2019 10:35 AM  Performed by: Otilio Farnsworth MD  Authorized by: Otilio Farnsworth MD   Rhythm comments: Normal sinus rhythm, anterior infarct, age undetermined, abnormal ECG, 86 bpm,  ms, QRS 94 ms,  ms, no significant changes from last ECG in January 2017                 Objective:       Vitals:    09/13/19 0933 09/13/19 0935 09/13/19 0936 09/13/19 0937   BP: 109/68 106/69 116/74 115/73   BP Location: Left arm Left arm Right arm Right arm   Patient Position: Sitting Standing Sitting Standing   Pulse: 72  85 90   Resp: 18      Weight: 96.2 kg (212 lb)      Height: 185.4 cm (72.99\")        Body mass index is 27.98 kg/m².     Physical Exam   Constitutional: He is oriented to person, place, and time. He appears well-developed and well-nourished.   HENT:   Mouth/Throat: Uvula is midline, oropharynx is clear and moist and mucous membranes are normal. He does not have dentures. No oral lesions. Normal dentition. No dental abscesses, uvula swelling, lacerations or dental caries.   Eyes:   Fundoscopic exam:       The right eye shows AV nicking. The right eye shows no exudate and no hemorrhage.        The left eye shows AV nicking. The left eye shows no " exudate and no hemorrhage.   Neck: No JVD present. Carotid bruit is not present. No thyromegaly present.   Cardiovascular: Regular rhythm, S1 normal, S2 normal and normal heart sounds. Exam reveals no gallop, no S3 and no friction rub.   No murmur heard.  Pulses:       Dorsalis pedis pulses are 2+ on the right side, and 2+ on the left side.        Posterior tibial pulses are 2+ on the right side, and 2+ on the left side.   Pulmonary/Chest: Effort normal and breath sounds normal. He has no wheezes. He has no rhonchi. He has no rales.   Abdominal: Soft. He exhibits no mass. There is no hepatosplenomegaly. There is no tenderness. There is no guarding.   Bowel sounds audible x4   Musculoskeletal: Normal range of motion. He exhibits no edema.   Lymphadenopathy:     He has no cervical adenopathy.   Neurological: He is alert and oriented to person, place, and time.   Skin: Skin is warm, dry and intact. No rash noted.   Vitals reviewed.      Lab Review:   Results for orders placed or performed in visit on 09/04/19   CK Total & CKMB   Result Value Ref Range    CKMB 4.33 <=10.40 ng/mL    Creatine Kinase 136 20 - 200 U/L   Magnesium   Result Value Ref Range    Magnesium 2.0 1.6 - 2.4 mg/dL   Phosphorus   Result Value Ref Range    Phosphorus 3.5 2.5 - 4.5 mg/dL     5/7/2019:  · Lipid panel: Cholesterol 111, triglycerides 155, HDL 48, LDL 32    8/6/2019:   · Hemoglobin A1c - 8.2%  · CMP: Glucose 207, BUN 13, creatinine 0.8, sodium 139, potassium 4.3, chloride 99, carbon dioxide 25.8, calcium 9.6, protein 7.5, albumin 4.3, ALT 57, AST 52, alkaline phosphatase 135, bilirubin 0.9, GFR 97, globulin 3.2    · Nerve conduction testing 9/12/2019: Nerve and muscle study does confirm Peripheral neuropathy, axonal, moderate-nerve damage describing numbness, tingling and pain in legs. Has some nerve damage in left wrist and left elbow. No obvious motor/muscle issues at this time, No ALS at this time. If symptoms worsen we would plan to  repeat the testing in 6-8 months per Dr. Paniagua's recommendations. Neuropathy likely secondary to diabetes.    Assessment:   Overall continued acceptable course with no new interim cardiopulmonary complaints with acceptable functional status. We will defer additional diagnostic or therapeutic intervention from a cardiac perspective at this time.  His EKG is acceptable in office today.  I encouraged the patient to follow up with his neurologist regarding his recent nerve conduction testing, as well as his sleep study.  We encouraged the patient to continue his physical activity and to adhere to a heart healthy diet in view of his hemoglobin A1c of 8.2%.  We feel we can reduce some of his medications since he is on quite a bit and wishes to reduce whatever medications that are not currently mandated.     Diagnosis Plan   1. Coronary artery disease involving native heart without angina pectoris, unspecified vessel or lesion type   Stable, acceptable ECG in office today, continue atorvastatin, Coreg,  lisinopril, aspirin, Repatha, Ozempic, consume and discontinue Plavix   2. Atrial flutter, unspecified type (CMS/HCC)   Acceptable ECG in office today, no recurrent palpitations   3. Essential hypertension   Controlled, discontinue amlodipine, increase Coreg to 12.5 mg twice daily, continue lisinopril   4. KIM (obstructive sleep apnea)   Encouraged the patient to follow-up with his neurologist regarding his recent sleep study and nerve conduction study   5. Type 2 diabetes mellitus with diabetic polyneuropathy, without long-term current use of insulin (CMS/[facility])   Encouraged the patient to adhere to a heart healthy diet, last hemoglobin A1c was 8.2%   6. Dyslipidemia   Acceptable results May 2019, continue atorvastatin, Repatha          Plan:       1. Patient to continue current medications and close follow up with the above providers with the following alterations:   A. Consume and discontinue Plavix   B. Discontinue  amlodipine   C. Alter Coreg dose to 12.5 mg bid  2. Tentative cardiology follow up in March 2020, or patient may return sooner PRN.  3. 1 800 card provided    Scribed for Otilio Farnsworth MD by Anna Tidwell, APRN. 9/13/2019  10:33 AM    I, Otilio Farnsworth MD, [facility], personally performed the services described in this documentation as scribed by the above named individual in my presence, and it is both accurate and complete. At 11:11 AM on 09/13/2019

## 2019-09-11 ENCOUNTER — HOSPITAL ENCOUNTER (OUTPATIENT)
Dept: NEUROLOGY | Facility: HOSPITAL | Age: 64
Discharge: HOME | End: 2019-09-11
Admitting: NURSE PRACTITIONER

## 2019-09-11 DIAGNOSIS — R25.3 FASCICULATIONS: ICD-10-CM

## 2019-09-11 PROCEDURE — 95911 NRV CNDJ TEST 9-10 STUDIES: CPT

## 2019-09-11 PROCEDURE — 95886 MUSC TEST DONE W/N TEST COMP: CPT

## 2019-09-12 NOTE — PROGRESS NOTES
Nerve and muscle study does confirm Peripheral neuropathy, axonal, moderate-nerve damage describing numbness, tingling and pain in legs. Has some nerve damage in left wrist and left elbow. No obvious motor/muscle issues at this time, No ALS at this time. If symptoms worsen we would plan to repeat the testing in 6-8 months per Dr. Paniagua's recommendations. Neuropathy likely 2nd to Diabetes. We will f/u as scheduled in office. Thanks, PAUL Hong

## 2019-09-13 ENCOUNTER — CONSULT (OUTPATIENT)
Dept: CARDIOLOGY | Facility: CLINIC | Age: 64
End: 2019-09-13

## 2019-09-13 VITALS
DIASTOLIC BLOOD PRESSURE: 73 MMHG | BODY MASS INDEX: 28.1 KG/M2 | HEART RATE: 90 BPM | SYSTOLIC BLOOD PRESSURE: 115 MMHG | WEIGHT: 212 LBS | HEIGHT: 73 IN | RESPIRATION RATE: 18 BRPM

## 2019-09-13 DIAGNOSIS — E78.5 DYSLIPIDEMIA: ICD-10-CM

## 2019-09-13 DIAGNOSIS — I25.10 CORONARY ARTERY DISEASE INVOLVING NATIVE HEART WITHOUT ANGINA PECTORIS, UNSPECIFIED VESSEL OR LESION TYPE: Primary | ICD-10-CM

## 2019-09-13 DIAGNOSIS — E11.42 TYPE 2 DIABETES MELLITUS WITH DIABETIC POLYNEUROPATHY, WITHOUT LONG-TERM CURRENT USE OF INSULIN (HCC): ICD-10-CM

## 2019-09-13 DIAGNOSIS — I10 ESSENTIAL HYPERTENSION: ICD-10-CM

## 2019-09-13 DIAGNOSIS — G47.33 OSA (OBSTRUCTIVE SLEEP APNEA): ICD-10-CM

## 2019-09-13 DIAGNOSIS — I48.92 ATRIAL FLUTTER, UNSPECIFIED TYPE (HCC): ICD-10-CM

## 2019-09-13 PROCEDURE — 99204 OFFICE O/P NEW MOD 45 MIN: CPT | Performed by: INTERNAL MEDICINE

## 2019-09-13 PROCEDURE — 93000 ELECTROCARDIOGRAM COMPLETE: CPT | Performed by: INTERNAL MEDICINE

## 2019-09-13 RX ORDER — CARVEDILOL 12.5 MG/1
12.5 TABLET ORAL 2 TIMES DAILY WITH MEALS
Qty: 180 TABLET | Refills: 3 | Status: SHIPPED | OUTPATIENT
Start: 2019-09-13 | End: 2020-09-08

## 2019-09-16 ENCOUNTER — TELEPHONE (OUTPATIENT)
Dept: NEUROLOGY | Facility: CLINIC | Age: 64
End: 2019-09-16

## 2019-09-16 NOTE — TELEPHONE ENCOUNTER
PT IS AWARE OF THE RESULTS BELOW AND REQUESTED HIS APPT BE CANCELED, DID NOT SEE A REASON FOR A F/U

## 2019-09-16 NOTE — TELEPHONE ENCOUNTER
----- Message from PAUL Hope sent at 9/12/2019  2:53 PM EDT -----  Nerve and muscle study does confirm Peripheral neuropathy, axonal, moderate-nerve damage describing numbness, tingling and pain in legs. Has some nerve damage in left wrist and left elbow. No obvious motor/muscle issues at this time, No ALS at this time. If symptoms worsen we would plan to repeat the testing in 6-8 months per Dr. Paniagua's recommendations. Neuropathy likely 2nd to Diabetes. We will f/u as scheduled in office. Thanks, PAUL Hong

## 2019-09-17 ENCOUNTER — TELEPHONE (OUTPATIENT)
Dept: FAMILY MEDICINE CLINIC | Facility: CLINIC | Age: 64
End: 2019-09-17

## 2019-09-17 ENCOUNTER — OFFICE VISIT (OUTPATIENT)
Dept: SLEEP MEDICINE | Facility: HOSPITAL | Age: 64
End: 2019-09-17

## 2019-09-17 ENCOUNTER — LAB (OUTPATIENT)
Dept: LAB | Facility: HOSPITAL | Age: 64
End: 2019-09-17

## 2019-09-17 ENCOUNTER — OFFICE VISIT (OUTPATIENT)
Dept: FAMILY MEDICINE CLINIC | Facility: CLINIC | Age: 64
End: 2019-09-17

## 2019-09-17 VITALS
WEIGHT: 214.6 LBS | HEART RATE: 85 BPM | BODY MASS INDEX: 28.44 KG/M2 | HEIGHT: 73 IN | SYSTOLIC BLOOD PRESSURE: 110 MMHG | DIASTOLIC BLOOD PRESSURE: 64 MMHG | OXYGEN SATURATION: 97 %

## 2019-09-17 VITALS
OXYGEN SATURATION: 95 % | WEIGHT: 213 LBS | HEART RATE: 83 BPM | HEIGHT: 73 IN | DIASTOLIC BLOOD PRESSURE: 70 MMHG | BODY MASS INDEX: 28.23 KG/M2 | SYSTOLIC BLOOD PRESSURE: 116 MMHG

## 2019-09-17 DIAGNOSIS — I25.10 CORONARY ARTERY DISEASE INVOLVING NATIVE HEART WITHOUT ANGINA PECTORIS, UNSPECIFIED VESSEL OR LESION TYPE: ICD-10-CM

## 2019-09-17 DIAGNOSIS — G47.33 OBSTRUCTIVE SLEEP APNEA, ADULT: Primary | ICD-10-CM

## 2019-09-17 DIAGNOSIS — E11.42 TYPE 2 DIABETES MELLITUS WITH DIABETIC POLYNEUROPATHY, WITHOUT LONG-TERM CURRENT USE OF INSULIN (HCC): ICD-10-CM

## 2019-09-17 DIAGNOSIS — Z12.5 SCREENING FOR PROSTATE CANCER: ICD-10-CM

## 2019-09-17 DIAGNOSIS — Z00.00 WELL ADULT EXAM: Primary | ICD-10-CM

## 2019-09-17 DIAGNOSIS — G47.33 OSA (OBSTRUCTIVE SLEEP APNEA): ICD-10-CM

## 2019-09-17 DIAGNOSIS — F33.1 MODERATE EPISODE OF RECURRENT MAJOR DEPRESSIVE DISORDER (HCC): ICD-10-CM

## 2019-09-17 DIAGNOSIS — E78.5 DYSLIPIDEMIA: ICD-10-CM

## 2019-09-17 DIAGNOSIS — G47.34 NOCTURNAL HYPOXEMIA: ICD-10-CM

## 2019-09-17 LAB
ALBUMIN SERPL-MCNC: 4.5 G/DL (ref 3.5–5.2)
ALBUMIN/GLOB SERPL: 1.6 G/DL
ALP SERPL-CCNC: 113 U/L (ref 39–117)
ALT SERPL W P-5'-P-CCNC: 33 U/L (ref 1–41)
ANION GAP SERPL CALCULATED.3IONS-SCNC: 13.7 MMOL/L (ref 5–15)
AST SERPL-CCNC: 42 U/L (ref 1–40)
BILIRUB SERPL-MCNC: 0.7 MG/DL (ref 0.2–1.2)
BUN BLD-MCNC: 13 MG/DL (ref 8–23)
BUN/CREAT SERPL: 13.5 (ref 7–25)
CALCIUM SPEC-SCNC: 9.4 MG/DL (ref 8.6–10.5)
CHLORIDE SERPL-SCNC: 103 MMOL/L (ref 98–107)
CHOLEST SERPL-MCNC: 104 MG/DL (ref 0–200)
CO2 SERPL-SCNC: 25.3 MMOL/L (ref 22–29)
CREAT BLD-MCNC: 0.96 MG/DL (ref 0.76–1.27)
GFR SERPL CREATININE-BSD FRML MDRD: 79 ML/MIN/1.73
GLOBULIN UR ELPH-MCNC: 2.9 GM/DL
GLUCOSE BLD-MCNC: 135 MG/DL (ref 65–99)
HDLC SERPL-MCNC: 42 MG/DL (ref 40–60)
LDLC SERPL CALC-MCNC: 33 MG/DL (ref 0–100)
LDLC/HDLC SERPL: 0.79 {RATIO}
POTASSIUM BLD-SCNC: 4.5 MMOL/L (ref 3.5–5.2)
PROT SERPL-MCNC: 7.4 G/DL (ref 6–8.5)
PSA SERPL-MCNC: 0.4 NG/ML (ref 0–4)
SODIUM BLD-SCNC: 142 MMOL/L (ref 136–145)
TRIGL SERPL-MCNC: 144 MG/DL (ref 0–150)
VLDLC SERPL-MCNC: 28.8 MG/DL (ref 5–40)

## 2019-09-17 PROCEDURE — 80053 COMPREHEN METABOLIC PANEL: CPT

## 2019-09-17 PROCEDURE — G0402 INITIAL PREVENTIVE EXAM: HCPCS | Performed by: FAMILY MEDICINE

## 2019-09-17 PROCEDURE — G0103 PSA SCREENING: HCPCS

## 2019-09-17 PROCEDURE — 80061 LIPID PANEL: CPT

## 2019-09-17 PROCEDURE — 99213 OFFICE O/P EST LOW 20 MIN: CPT | Performed by: NURSE PRACTITIONER

## 2019-09-17 PROCEDURE — 99397 PER PM REEVAL EST PAT 65+ YR: CPT | Performed by: FAMILY MEDICINE

## 2019-09-17 NOTE — PATIENT INSTRUCTIONS
Pneumococcal Polysaccharide Vaccine: What You Need to Know  1. Why get vaccinated?  Vaccination can protect older adults (and some children and younger adults) from pneumococcal disease.  Pneumococcal disease is caused by bacteria that can spread from person to person through close contact. It can cause ear infections, and it can also lead to more serious infections of the:  · Lungs (pneumonia),  · Blood (bacteremia), and  · Covering of the brain and spinal cord (meningitis). Meningitis can cause deafness and brain damage, and it can be fatal.  Anyone can get pneumococcal disease, but children under 2 years of age, people with certain medical conditions, adults over 65 years of age, and cigarette smokers are at the highest risk.  About 18,000 older adults die each year from pneumococcal disease in the [place].  Treatment of pneumococcal infections with penicillin and other drugs used to be more effective. But some strains of the disease have become resistant to these drugs. This makes prevention of the disease, through vaccination, even more important.  2. Pneumococcal polysaccharide vaccine (PPSV23)  Pneumococcal polysaccharide vaccine (PPSV23) protects against 23 types of pneumococcal bacteria. It will not prevent all pneumococcal disease.  PPSV23 is recommended for:  · All adults 65 years of age and older,  · Anyone 2 through 64 years of age with certain long-term health problems,  · Anyone 2 through 64 years of age with a weakened immune system,  · Adults 19 through 64 years of age who smoke cigarettes or have asthma.  Most people need only one dose of PPSV. A second dose is recommended for certain high-risk groups. People 65 and older should get a dose even if they have gotten one or more doses of the vaccine before they turned 65.  Your healthcare provider can give you more information about these recommendations.  Most healthy adults develop protection within 2 to 3 weeks of getting the shot.  3. Some  people should not get this vaccine  · Anyone who has had a life-threatening allergic reaction to PPSV should not get another dose.  · Anyone who has a severe allergy to any component of PPSV should not receive it. Tell your provider if you have any severe allergies.  · Anyone who is moderately or severely ill when the shot is scheduled may be asked to wait until they recover before getting the vaccine. Someone with a mild illness can usually be vaccinated.  · Children less than 2 years of age should not receive this vaccine.  · There is no evidence that PPSV is harmful to either a pregnant woman or to her fetus. However, as a precaution, women who need the vaccine should be vaccinated before becoming pregnant, if possible.  4. Risks of a vaccine reaction  With any medicine, including vaccines, there is a chance of side effects. These are usually mild and go away on their own, but serious reactions are also possible.  About half of people who get PPSV have mild side effects, such as redness or pain where the shot is given, which go away within about two days.  Less than 1 out of 100 people develop a fever, muscle aches, or more severe local reactions.  Problems that could happen after any vaccine:  · People sometimes faint after a medical procedure, including vaccination. Sitting or lying down for about 15 minutes can help prevent fainting, and injuries caused by a fall. Tell your doctor if you feel dizzy, or have vision changes or ringing in the ears.  · Some people get severe pain in the shoulder and have difficulty moving the arm where a shot was given. This happens very rarely.  · Any medication can cause a severe allergic reaction. Such reactions from a vaccine are very rare, estimated at about 1 in a million doses, and would happen within a few minutes to a few hours after the vaccination.  As with any medicine, there is a very remote chance of a vaccine causing a serious injury or death.  The safety of  vaccines is always being monitored. For more information, visit: www.cdc.gov/vaccinesafety/  5. What if there is a serious reaction?  What should I look for?  Look for anything that concerns you, such as signs of a severe allergic reaction, very high fever, or unusual behavior.  Signs of a severe allergic reaction can include hives, swelling of the face and throat, difficulty breathing, a fast heartbeat, dizziness, and weakness. These would usually start a few minutes to a few hours after the vaccination.  What should I do?  If you think it is a severe allergic reaction or other emergency that can't wait, call  or get to the nearest hospital. Otherwise, call your doctor.  Afterward, the reaction should be reported to the Vaccine Adverse Event Reporting System (VAERS). Your doctor might file this report, or you can do it yourself through the VAERS web site at www.vaers.[employer].gov, or by calling 1-600.398.1737.  [employer] does not give medical advice.  6. How can I learn more?  · Ask your doctor. He or she can give you the vaccine package insert or suggest other sources of information.  · Call your local or state health department.  · Contact the Centers for Disease Control and Prevention (CDC):  ? Call 1-922.411.6201 (7-968-ZLS-INFO) or  ? Visit CDC's website at www.cdc.gov/vaccines  CDC Vaccine Information Statement PPSV Vaccine (2015)  This information is not intended to replace advice given to you by your health care provider. Make sure you discuss any questions you have with your health care provider.  Document Released: 10/15/2007 Document Revised: 2018 Document Reviewed: 2018  Elsevier Interactive Patient Education © 2019 [employer]    Medicare Wellness  Personal Prevention Plan of Service     Date of Office Visit:  2019  Encounter Provider:  Erika Landrum MD  Place of Service:  [facility]  Patient Name: Bryan Oswald  :  1955    As  part of the Medicare Wellness portion of your visit today, we are providing you with this personalized preventive plan of services (PPPS). This plan is based upon recommendations of the [place] Preventive Services Task Force (USPSTF) and the Advisory Committee on Immunization Practices (ACIP).    This lists the preventive care services that should be considered, and provides dates of when you are due. Items listed as completed are up-to-date and do not require any further intervention.    Health Maintenance   Topic Date Due   • MEDICARE ANNUAL WELLNESS  11/20/2017   • DIABETIC EYE EXAM  11/20/2017   • ZOSTER VACCINE (2 of 2) 09/30/2018   • INFLUENZA VACCINE  08/01/2019   • PNEUMOCOCCAL VACCINES (65+ LOW/MEDIUM RISK) (2 of 2 - PPSV23) 10/24/2019   • TDAP/TD VACCINES (2 - Td) 01/01/2020   • HEMOGLOBIN A1C  02/06/2020   • DIABETIC FOOT EXAM  05/07/2020   • LIPID PANEL  05/07/2020   • URINE MICROALBUMIN  05/07/2020   • COLONOSCOPY  05/31/2020   • HEPATITIS C SCREENING  Completed       Orders Placed This Encounter   Procedures   • Comprehensive Metabolic Panel     Standing Status:   Future     Standing Expiration Date:   9/17/2020   • Lipid Panel     Standing Status:   Future     Standing Expiration Date:   9/17/2020   • PSA Screen     Standing Status:   Future     Standing Expiration Date:   9/17/2020       Return in about 2 months (around 11/17/2019) for Follow-up diabetes.

## 2019-09-17 NOTE — PROGRESS NOTES
Chief Complaint:   Chief Complaint   Patient presents with   • Follow-up       HPI:    Bryan Oswald is a 63 y.o. male here for follow-up of sleep study results.  Patient states he was seen Ms. Villalobos regarding tremors.  She obtained a history of sleeping problems and he was referred here.  He states that sleeping problems for at least 5 years and awakens frequently at night.  He says he never seems to sleep very long at night and awakens 2- 3 times during the night.  He often has problems getting back to sleep.  He says he did sleep 8 hours last evening but feels still tired today.  He has had snoring noted since 1984.  He denies being told he had apneas.  He denies waking gasping for breath.  He says he is not rested in the morning.   Patient is sleeping 7 to 8 hours nightly and does not feel refreshed upon awakening.  Patient has an [place] score of 11/24.  Patient did have a sleep study 9/9/2019 that did show moderate obstructive sleep apnea with an AHI of 25.  Study also showed nocturnal hypoxemia.  We have discussed the consequences of untreated sleep apnea especially with his history of cardiology problems.  We have discussed the importance of treating nocturnal hypoxemia.  We have discussed different therapies available to him.  Patient declines any treatment at this time.  Again, I spoke to him about untreated sleep apnea and he again declines.    Current medications are:   Current Outpatient Medications:   •  aspirin 81 MG EC tablet, Take 1 tablet by mouth Daily., Disp: , Rfl:   •  atorvastatin (LIPITOR) 40 MG tablet, Take 1 tablet by mouth Every Night., Disp: 90 tablet, Rfl: 1  •  carvedilol (COREG) 12.5 MG tablet, Take 1 tablet by mouth 2 (Two) Times a Day With Meals., Disp: 180 tablet, Rfl: 3  •  cetirizine (zyrTEC) 10 MG tablet, Take 10 mg by mouth Daily., Disp: , Rfl:   •  Dapagliflozin Propanediol (FARXIGA) 10 MG tablet, Take 10 mg by mouth Daily., Disp: 90 tablet, Rfl: 1  •  escitalopram  (LEXAPRO) 10 MG tablet, Take 1 tablet by mouth Daily., Disp: 90 tablet, Rfl: 1  •  Evolocumab (REPATHA SURECLICK) 140 MG/ML solution auto-injector, Inject  under the skin., Disp: , Rfl:   •  ferrous sulfate 325 (65 FE) MG tablet, Take 325 mg by mouth., Disp: , Rfl:   •  gabapentin (NEURONTIN) 300 MG capsule, Take 1 capsule by mouth 4 (Four) Times a Day As Needed (neuropathy)., Disp: 360 capsule, Rfl: 0  •  glucose blood test strip, Use to test blood sugars once daily., Disp: 50 each, Rfl: 11  •  lisinopril (PRINIVIL,ZESTRIL) 10 MG tablet, Take 1 tablet by mouth Daily., Disp: 90 tablet, Rfl: 1  •  melatonin 3 MG tablet, Take 10 mg by mouth., Disp: , Rfl:   •  meloxicam (MOBIC) 7.5 MG tablet, Take 1 tablet by mouth Daily As Needed for Moderate Pain ., Disp: 90 tablet, Rfl: 1  •  metFORMIN (GLUCOPHAGE) 1000 MG tablet, Take 1 tablet by mouth 2 (Two) Times a Day With Meals., Disp: 180 tablet, Rfl: 3  •  nitroglycerin (NITROSTAT) 0.4 MG SL tablet, , Disp: , Rfl:   •  omeprazole (priLOSEC) 40 MG capsule, Take 1 capsule by mouth Daily., Disp: 90 capsule, Rfl: 1  •  Semaglutide (OZEMPIC) 1 MG/DOSE solution pen-injector, Inject 1 mg under the skin into the appropriate area as directed 1 (One) Time Per Week., Disp: 4 pen, Rfl: 5.      The patient's relevant past medical, surgical, family and social history were reviewed and updated in Epic as appropriate.       Review of Systems   Constitutional: Positive for fatigue and unexpected weight change.   HENT: Positive for hearing loss and tinnitus.    Eyes: Positive for visual disturbance.   Respiratory: Positive for apnea.    Cardiovascular: Positive for chest pain and palpitations.   Gastrointestinal: Positive for constipation.   Endocrine: Positive for polydipsia and polyuria.   Musculoskeletal: Positive for arthralgias, back pain and myalgias.   Neurological: Positive for dizziness, tremors, light-headedness and numbness.   Psychiatric/Behavioral: Positive for confusion,  "decreased concentration, dysphoric mood and sleep disturbance.   All other systems reviewed and are negative.        Objective:    Physical Exam   Constitutional: He is oriented to person, place, and time. He appears well-developed and well-nourished.   HENT:   Head: Normocephalic and atraumatic.   Mouth/Throat: Oropharynx is clear and moist.   Mallampati 4 anatomy   Eyes: Conjunctivae are normal.   Neck: Neck supple. No thyromegaly present.   Cardiovascular: Normal rate and regular rhythm.   Pulmonary/Chest: Effort normal and breath sounds normal.   Lymphadenopathy:     He has no cervical adenopathy.   Neurological: He is alert and oriented to person, place, and time.   Skin: Skin is warm and dry.   Psychiatric: He has a normal mood and affect. His behavior is normal. Judgment and thought content normal.   Nursing note and vitals reviewed.        ASSESSMENT/PLAN    Bryan was seen today for follow-up.    Diagnoses and all orders for this visit:    Obstructive sleep apnea, adult    Nocturnal hypoxemia            1. Counseled patient regarding multimodal approach with healthy nutrition, healthy sleep, regular physical activity, social activities, counseling, and medications. Encouraged to practice lateral sleep position. Avoid alcohol and sedatives close to bedtime.  2.   Patient declines any treatment for CPAP or low oxygen.  Patient states \"I have to die of something.\"  Patient has been encouraged to lose weight and have lateral sleep position.  Patient is encouraged to think about his decision to call me back should he reconsider.  I have reviewed the results of my evaluation and impression and discussed my recommendations in detail with the patient.      Signed by  Arlet Bryan, APRN    September 17, 2019      CC: Erika Vargas MD          No ref. provider found      "

## 2019-09-17 NOTE — PROGRESS NOTES
The ABCs of the Annual Wellness Visit  Welcome to Medicare Visit    Chief Complaint   Patient presents with   • Welcome To Medicare       Subjective   History of Present Illness:  Bryan Oswald is a 63 y.o. male who presents for a  Welcome to Medicare Visit.    Planning to get second dose pneumonia shot next month. Already had flu vaccine this season. Unsure if he has had all Shingles immunization.     Still has fasciculations in his leg. He already knew he had diabetic neuropathy.     Diagnosed with sleep apnea, but concerned CPAP machine would aggravate him.     He hasn't had candy or cake. Used to eat 4 candy bars per day. Cut way back to avoid starting insulin.     Feels tired all the time.     Upset about colonoscopy, told prep was poor but he took all the medication.     He has ordered hearing aids.    He is in a research study for testosterone for injection treatment.     Dermatologist treated precancerous lesions and rosacea.     Patient's last eye exam was approximately a year and a half ago.  He has concerns if his upper eyelids are affecting his vision.    HEALTH RISK ASSESSMENT    Recent Hospitalizations:  No hospitalization(s) within the last year.    Current Medical Providers:  Patient Care Team:  Erika Vargas MD as PCP - General (Family Medicine)  Bindu Pavon DO as PCP - Claims Attributed  Jones Marroquin MD (Interventional Cardiology)    Smoking Status:  Social History     Tobacco Use   Smoking Status Never Smoker   Smokeless Tobacco Never Used       Alcohol Consumption:  Social History     Substance and Sexual Activity   Alcohol Use Yes   • Types: 2 Cans of beer per week    Comment: occ       Depression Screen:   PHQ-2/PHQ-9 Depression Screening 9/17/2019   Little interest or pleasure in doing things 3   Feeling down, depressed, or hopeless 0   Trouble falling or staying asleep, or sleeping too much 2   Feeling tired or having little energy 3   Poor appetite or overeating 1    Feeling bad about yourself - or that you are a failure or have let yourself or your family down 1   Trouble concentrating on things, such as reading the newspaper or watching television 1   Moving or speaking so slowly that other people could have noticed. Or the opposite - being so fidgety or restless that you have been moving around a lot more than usual 0   Thoughts that you would be better off dead, or of hurting yourself in some way 0   Total Score 11   If you checked off any problems, how difficult have these problems made it for you to do your work, take care of things at home, or get along with other people? Somewhat difficult       Fall Risk Screen:  ROYCE Fall Risk Assessment was completed, and patient is at MODERATE risk for falls. Assessment completed on:8/6/2019    Health Habits and Functional and Cognitive Screening:  Functional & Cognitive Status 9/17/2019   Do you have difficulty preparing food and eating? No   Do you have difficulty bathing yourself, getting dressed or grooming yourself? No   Do you have difficulty using the toilet? No   Do you have difficulty moving around from place to place? No   Do you have trouble with steps or getting out of a bed or a chair? No   Current Diet Well Balanced Diet   Dental Exam Not up to date   Eye Exam Not up to date   Exercise (times per week) 0 times per week   Current Exercise Activities Include None   Do you need help using the phone?  No   Are you deaf or do you have serious difficulty hearing?  Yes   Do you need help with transportation? No   Do you need help shopping? No   Do you need help preparing meals?  No   Do you need help with housework?  No   Do you need help with laundry? No   Do you need help taking your medications? No   Do you need help managing money? No   Do you ever drive or ride in a car without wearing a seat belt? Yes   Have you felt unusual stress, anger or loneliness in the last month? Yes   Who do you live with? Alone   If you  need help, do you have trouble finding someone available to you? No   Have you been bothered in the last four weeks by sexual problems? No   Do you have difficulty concentrating, remembering or making decisions? Yes         Does the patient have evidence of cognitive impairment? Yes  203 words recalled.  Normal clock draw test.  Patient does report difficulty with his memory at times.    Asprin use counseling:Taking ASA appropriately as indicated    Visual Acuity:     Hearing Screening    Method: Audiometry    125Hz 250Hz 500Hz 1000Hz 2000Hz 3000Hz 4000Hz 6000Hz 8000Hz   Right ear: Pass Pass Pass Pass Pass Pass Fail Fail Fail   Left ear: Pass Pass Pass Pass Pass Fail Fail Fail Fail      Visual Acuity Screening    Right eye Left eye Both eyes   Without correction:      With correction: 20/50 20/30 20/30       Age-appropriate Screening Schedule:  Refer to the list below for future screening recommendations based on patient's age, sex and/or medical conditions. Orders for these recommended tests are listed in the plan section. The patient has been provided with a written plan.    Health Maintenance   Topic Date Due   • DIABETIC EYE EXAM  11/20/2017   • ZOSTER VACCINE (2 of 2) 09/30/2018   • INFLUENZA VACCINE  08/01/2019   • PNEUMOCOCCAL VACCINES (65+ LOW/MEDIUM RISK) (2 of 2 - PPSV23) 10/24/2019   • TDAP/TD VACCINES (2 - Td) 01/01/2020   • HEMOGLOBIN A1C  02/06/2020   • DIABETIC FOOT EXAM  05/07/2020   • LIPID PANEL  05/07/2020   • URINE MICROALBUMIN  05/07/2020   • COLONOSCOPY  05/31/2020          The following portions of the patient's history were reviewed and updated as appropriate: He  has a past medical history of A-fib (CMS/HCC), Actinic keratosis, Adenomatous colon polyp, Allergic, Anemia, Atrial flutter (CMS/HCC), Carpal tunnel syndrome, Cervicalgia, Coronary artery disease, Depression, Dyslipidemia, Esophageal stricture, GERD (gastroesophageal reflux disease), Headache, tension-type (sometimes), History of  gout, HL (hearing loss) (last few years), Hyperlipidemia, Hypertension, Hypogonadism male, Left ventricular hypertrophy, Low back pain, Memory loss (2-3 years), Migraines, Nephrolithiasis, Neuropathy in diabetes (CMS/HCC) (3 years), Obesity, Osteoarthritis, Peripheral neuropathy, Rosacea, Sleep apnea (i snore a lot), Tubular adenoma of colon (05/31/2019), Type 2 diabetes mellitus (CMS/HCC), and Vision loss (constant).  He  has a past surgical history that includes Appendectomy; Tonsillectomy; Coronary stent placement; Esophageal dilation; Cardiac Ablation; Coronary angioplasty; and Carotid stent.  His family history includes Cancer in his sister; Diabetes in his mother; Dystonia in his son; Heart disease in his father and mother; Hypertension in his mother; Sleep apnea in his daughter; Thyroid disease in his mother.  He  reports that he has never smoked. He has never used smokeless tobacco. He reports that he drinks alcohol. He reports that he does not use drugs.  He has No Known Allergies..    Outpatient Medications Prior to Visit   Medication Sig Dispense Refill   • aspirin 81 MG EC tablet Take 1 tablet by mouth Daily.     • atorvastatin (LIPITOR) 40 MG tablet Take 1 tablet by mouth Every Night. 90 tablet 1   • carvedilol (COREG) 12.5 MG tablet Take 1 tablet by mouth 2 (Two) Times a Day With Meals. 180 tablet 3   • cetirizine (zyrTEC) 10 MG tablet Take 10 mg by mouth Daily.     • Dapagliflozin Propanediol (FARXIGA) 10 MG tablet Take 10 mg by mouth Daily. 90 tablet 1   • escitalopram (LEXAPRO) 10 MG tablet Take 1 tablet by mouth Daily. 90 tablet 1   • Evolocumab (REPATHA SURECLICK) 140 MG/ML solution auto-injector Inject  under the skin.     • ferrous sulfate 325 (65 FE) MG tablet Take 325 mg by mouth.     • gabapentin (NEURONTIN) 300 MG capsule Take 1 capsule by mouth 4 (Four) Times a Day As Needed (neuropathy). 360 capsule 0   • glucose blood test strip Use to test blood sugars once daily. 50 each 11   •  lisinopril (PRINIVIL,ZESTRIL) 10 MG tablet Take 1 tablet by mouth Daily. 90 tablet 1   • melatonin 3 MG tablet Take 10 mg by mouth.     • meloxicam (MOBIC) 7.5 MG tablet Take 1 tablet by mouth Daily As Needed for Moderate Pain . 90 tablet 1   • metFORMIN (GLUCOPHAGE) 1000 MG tablet Take 1 tablet by mouth 2 (Two) Times a Day With Meals. 180 tablet 3   • nitroglycerin (NITROSTAT) 0.4 MG SL tablet      • omeprazole (priLOSEC) 40 MG capsule Take 1 capsule by mouth Daily. 90 capsule 1   • Semaglutide (OZEMPIC) 1 MG/DOSE solution pen-injector Inject 1 mg under the skin into the appropriate area as directed 1 (One) Time Per Week. 4 pen 5   • Unable to find Inject 1 each as directed 1 (One) Time. Med Name: research drug for low testosterone       No facility-administered medications prior to visit.        Patient Active Problem List   Diagnosis   • Atrial flutter (CMS/HCC)   • Coronary artery disease   • HTN (hypertension)   • Dyslipidemia   • Type 2 diabetes mellitus with diabetic polyneuropathy (CMS/HCC)   • Obesity due to excess calories   • GERD (gastroesophageal reflux disease)   • History of gout   • Idiopathic peripheral neuropathy   • Depression   • Anemia   • Migraine headache   • Low back strain   • Hypogonadism in male   • Esophageal stricture   • Hx of adenomatous colonic polyps   • Iron deficiency   • Elevated liver enzymes   • Fasciculations   • Excessive daytime sleepiness   • History of radiofrequency ablation (RFA) procedure for cardiac arrhythmia   • Snoring   • KIM (obstructive sleep apnea)   • Moderate episode of recurrent major depressive disorder (CMS/HCC)       Advanced Care Planning:  Patient does not have an advance directive - information provided to the patient today    Review of Systems   Constitutional: Positive for appetite change and fatigue.   HENT: Positive for hearing loss.    Genitourinary: Negative for decreased urine volume and difficulty urinating.   Neurological: Positive for numbness.  "  Psychiatric/Behavioral: Positive for decreased concentration and dysphoric mood. Negative for suicidal ideas.       Compared to one year ago, the patient feels his physical health is worse.  Compared to one year ago, the patient feels his mental health is better.    Reviewed chart for potential of high risk medication in the elderly: yes  Reviewed chart for potential of harmful drug interactions in the elderly:not applicable    Objective         Vitals:    09/17/19 1010   BP: 110/64   BP Location: Left arm   Patient Position: Sitting   Cuff Size: Adult   Pulse: 85   SpO2: 97%   Weight: 97.3 kg (214 lb 9.6 oz)   Height: 185.4 cm (73\")   PainSc: 0-No pain       Body mass index is 28.31 kg/m².  Discussed the patient's BMI with him. The BMI is above average; BMI management plan is completed.  Patient's Body mass index is 28.31 kg/m². BMI is above normal parameters. Recommendations include: nutrition counseling.    Physical Exam   Constitutional: He is oriented to person, place, and time. No distress.   HENT:   Right Ear: Tympanic membrane and ear canal normal.   Left Ear: Tympanic membrane and ear canal normal.   Nose: Nose normal.   Mouth/Throat: Oropharynx is clear and moist and mucous membranes are normal. No oral lesions.   Eyes: Right eye exhibits no discharge. Left eye exhibits no discharge.   Wears glasses   Neck: Neck supple. No thyromegaly present.   Cardiovascular: Normal rate, regular rhythm and normal heart sounds.   No murmur heard.  Pulmonary/Chest: Effort normal and breath sounds normal.   Abdominal: Soft. Bowel sounds are normal. There is no tenderness. No hernia.   Diastases recti   Musculoskeletal: He exhibits no edema.   Lymphadenopathy:        Head (right side): No submandibular, no preauricular and no posterior auricular adenopathy present.        Head (left side): No submandibular, no preauricular and no posterior auricular adenopathy present.     He has no cervical adenopathy.   Neurological: He " is alert and oriented to person, place, and time.   Skin: Skin is warm and dry.   Psychiatric: He has a normal mood and affect. His behavior is normal. Judgment and thought content normal.       Lab Results   Component Value Date    HGBA1C 8.20 (H) 08/06/2019          Assessment/Plan   Medicare Risks and Personalized Health Plan  CMS Preventative Services Quick Reference  Advance Directive Discussion  Cardiovascular risk  Colon Cancer Screening  Depression/Dysphoria  Hearing Problem  Immunizations Discussed/Encouraged (specific immunizations; Influenza, Pneumococcal 23 and Shingrix )  Obesity/Overweight   Prostate Cancer Screening     Counseled on health maintenance topics and preventative care recommendations: Influenza vaccine, pneumonia vaccine, shingles vaccine, prostate cancer screening, colon cancer screening, designing advanced directives.    The above risks/problems have been discussed with the patient.  Pertinent information has been shared with the patient in the After Visit Summary.  Follow up plans and orders are seen below in the Assessment/Plan Section.    Diagnoses and all orders for this visit:    1. Well adult exam (Primary)  -     Cancel: ECG 12 Lead  Obtain immunization records from local pharmacy as patient is unsure dates of his vaccines.    2. Moderate episode of recurrent major depressive disorder (CMS/[facility])  Continue Lexapro.  3. KIM (obstructive sleep apnea)  Patient was seen by sleep clinic earlier today.  He declined CPAP therapy.  Discussed with the patient has ongoing fatigue could be related to untreated sleep apnea.  4. Type 2 diabetes mellitus with diabetic polyneuropathy, without long-term current use of insulin (CMS/[facility])  -     Comprehensive Metabolic Panel; Future  -     Lipid Panel; Future  Patient had recent EMG/NCS testing done which confirmed diabetic neuropathy.  He is on gabapentin therapy.  5. Dyslipidemia  -     Comprehensive Metabolic Panel; Future  -     Lipid Panel;  Future  Check labs today.  6. Coronary artery disease involving native heart without angina pectoris, unspecified vessel or lesion type  -     Comprehensive Metabolic Panel; Future  -     Lipid Panel; Future  Patient under the care of cardiology and recently had an EKG done 9/13/2019 which was reviewed showing normal sinus rhythm.  7. Screening for prostate cancer  -     PSA Screen; Future  Patient states he has regular prostate exams as part of his research today for testosterone and deferred today.  Check PSA level.      Follow Up:  Return in about 2 months (around 11/17/2019) for Follow-up diabetes.     An After Visit Summary and PPPS were given to the patient.

## 2019-10-02 ENCOUNTER — TELEPHONE (OUTPATIENT)
Dept: SLEEP MEDICINE | Facility: HOSPITAL | Age: 64
End: 2019-10-02

## 2019-10-02 NOTE — TELEPHONE ENCOUNTER
PT CALLED AND LEFT VOICE MAIL TO SEND ORDER TO [facility]. PT WOULD LIKE A PHONE CALL [phone] LETTING HIM KNOW THIS HAS BEEN SENT

## 2019-10-02 NOTE — TELEPHONE ENCOUNTER
CALLED PATIENT TO ADVISE ORDER FAXED TO [facility]. PATIENT VERBALIZED UNDERSTANDING AND STATED THAT HE WOULD CALL BACK AFTER GETTING MACHINE TO SCHEDULE COMPLIANCE VISIT 10/02/19 TRC

## 2019-10-21 ENCOUNTER — TELEPHONE (OUTPATIENT)
Dept: SLEEP MEDICINE | Facility: HOSPITAL | Age: 64
End: 2019-10-21

## 2019-10-21 NOTE — TELEPHONE ENCOUNTER
PT CALLED AND STATES HE IS NOT FEELING ANY RELIEF FROM CPAP AND IS JUST AS TIRED, WOULD LIKE RENEE TO CALL HIM

## 2019-10-22 NOTE — TELEPHONE ENCOUNTER
I have returned pt call, pt states he in the middle of an appointment and would call back when he was done

## 2019-10-23 DIAGNOSIS — E11.42 TYPE 2 DIABETES MELLITUS WITH DIABETIC POLYNEUROPATHY, WITHOUT LONG-TERM CURRENT USE OF INSULIN (HCC): ICD-10-CM

## 2019-10-23 RX ORDER — GABAPENTIN 300 MG/1
CAPSULE ORAL
Qty: 360 CAPSULE | Refills: 0 | Status: SHIPPED | OUTPATIENT
Start: 2019-10-23 | End: 2020-01-23

## 2019-10-23 NOTE — TELEPHONE ENCOUNTER
Last fill: 8/6/19  Last office visit: 9/17/19  Next office visit: 11/25/19  Last Isaiah: 9/4/19  Controlled substance contract on file? Yes - 11/8/18 update soon  Last UDS: n/a    OV notes are updated for his next visit to update UDS/CSA

## 2019-11-10 DIAGNOSIS — E11.42 TYPE 2 DIABETES MELLITUS WITH DIABETIC POLYNEUROPATHY, WITHOUT LONG-TERM CURRENT USE OF INSULIN (HCC): ICD-10-CM

## 2019-11-11 RX ORDER — DAPAGLIFLOZIN 10 MG/1
TABLET, FILM COATED ORAL
Qty: 90 TABLET | Refills: 0 | Status: SHIPPED | OUTPATIENT
Start: 2019-11-11 | End: 2020-02-07

## 2019-11-14 ENCOUNTER — OFFICE VISIT (OUTPATIENT)
Dept: SLEEP MEDICINE | Facility: HOSPITAL | Age: 64
End: 2019-11-14

## 2019-11-14 VITALS
SYSTOLIC BLOOD PRESSURE: 126 MMHG | BODY MASS INDEX: 29.13 KG/M2 | WEIGHT: 219.8 LBS | HEIGHT: 73 IN | OXYGEN SATURATION: 98 % | HEART RATE: 80 BPM | DIASTOLIC BLOOD PRESSURE: 75 MMHG

## 2019-11-14 DIAGNOSIS — G47.10 HYPERSOMNIA WITH SLEEP APNEA: ICD-10-CM

## 2019-11-14 DIAGNOSIS — G47.30 HYPERSOMNIA WITH SLEEP APNEA: ICD-10-CM

## 2019-11-14 DIAGNOSIS — G47.34 NOCTURNAL HYPOXEMIA: ICD-10-CM

## 2019-11-14 DIAGNOSIS — G47.33 OBSTRUCTIVE SLEEP APNEA, ADULT: Primary | ICD-10-CM

## 2019-11-14 PROCEDURE — 99213 OFFICE O/P EST LOW 20 MIN: CPT | Performed by: NURSE PRACTITIONER

## 2019-11-14 RX ORDER — MODAFINIL 200 MG/1
200 TABLET ORAL DAILY
Qty: 30 TABLET | Refills: 0 | Status: SHIPPED | OUTPATIENT
Start: 2019-11-14 | End: 2019-12-13 | Stop reason: SDUPTHER

## 2019-11-14 NOTE — PROGRESS NOTES
"    Chief Complaint:   Chief Complaint   Patient presents with   • Follow-up       HPI:    Bryan Oswald is a 64 y.o. male here for follow-up of sleep apnea.  Patient was last seen 9/17/2019 did decide to initiate CPAP therapy from sleep study 9/9/2019 showing moderate obstructive sleep apnea and nocturnal hypoxemia.  Patient is currently sleeping 7 hours nightly and is not refreshed upon awakening.  Patient is excessively sleepy throughout the day.  Patient has an Solway score of 17/24.  Even though patient is compliant he states he \"feels no difference\" when using CPAP.  We have discussed the possibility of stimulant therapy and patient wishes to move forward with this.  Patient does wish to continue with CPAP.        Current medications are:   Current Outpatient Medications:   •  aspirin 81 MG EC tablet, Take 1 tablet by mouth Daily., Disp: , Rfl:   •  atorvastatin (LIPITOR) 40 MG tablet, Take 1 tablet by mouth Every Night., Disp: 90 tablet, Rfl: 1  •  carvedilol (COREG) 12.5 MG tablet, Take 1 tablet by mouth 2 (Two) Times a Day With Meals., Disp: 180 tablet, Rfl: 3  •  cetirizine (zyrTEC) 10 MG tablet, Take 10 mg by mouth Daily., Disp: , Rfl:   •  escitalopram (LEXAPRO) 10 MG tablet, Take 1 tablet by mouth Daily., Disp: 90 tablet, Rfl: 1  •  Evolocumab (REPATHA SURECLICK) 140 MG/ML solution auto-injector, Inject  under the skin., Disp: , Rfl:   •  FARXIGA 10 MG tablet, TAKE ONE TABLET BY MOUTH DAILY, Disp: 90 tablet, Rfl: 0  •  ferrous sulfate 325 (65 FE) MG tablet, Take 325 mg by mouth., Disp: , Rfl:   •  gabapentin (NEURONTIN) 300 MG capsule, Take 1 capsule by mouth 4 (Four) Times a Day As Needed (neuropathy)., Disp: 360 capsule, Rfl: 0  •  gabapentin (NEURONTIN) 300 MG capsule, TAKE ONE CAPSULE BY MOUTH FOUR TIMES A DAY, Disp: 360 capsule, Rfl: 0  •  glucose blood test strip, Use to test blood sugars once daily., Disp: 50 each, Rfl: 11  •  lisinopril (PRINIVIL,ZESTRIL) 10 MG tablet, Take 1 tablet by mouth " Daily., Disp: 90 tablet, Rfl: 1  •  melatonin 3 MG tablet, Take 10 mg by mouth., Disp: , Rfl:   •  meloxicam (MOBIC) 7.5 MG tablet, Take 1 tablet by mouth Daily As Needed for Moderate Pain ., Disp: 90 tablet, Rfl: 1  •  metFORMIN (GLUCOPHAGE) 1000 MG tablet, Take 1 tablet by mouth 2 (Two) Times a Day With Meals., Disp: 180 tablet, Rfl: 1  •  nitroglycerin (NITROSTAT) 0.4 MG SL tablet, , Disp: , Rfl:   •  omeprazole (priLOSEC) 40 MG capsule, Take 1 capsule by mouth Daily., Disp: 90 capsule, Rfl: 1  •  Semaglutide (OZEMPIC) 1 MG/DOSE solution pen-injector, Inject 1 mg under the skin into the appropriate area as directed 1 (One) Time Per Week., Disp: 4 pen, Rfl: 5  •  Unable to find, Inject 1 each as directed 1 (One) Time. Med Name: research drug for low testosterone, Disp: , Rfl:   •  modafinil (PROVIGIL) 200 MG tablet, Take 1 tablet by mouth Daily., Disp: 30 tablet, Rfl: 0.      The patient's relevant past medical, surgical, family and social history were reviewed and updated in Epic as appropriate.       Review of Systems   Constitutional: Positive for fatigue.   HENT: Positive for hearing loss and tinnitus.    Eyes: Positive for visual disturbance.   Respiratory: Positive for apnea.    Cardiovascular: Positive for palpitations.   Gastrointestinal: Positive for constipation.   Endocrine: Positive for polydipsia and polyuria.   Musculoskeletal: Positive for arthralgias, back pain and myalgias.   Neurological: Positive for dizziness, tremors, light-headedness and numbness.   Psychiatric/Behavioral: Positive for confusion, decreased concentration, dysphoric mood and sleep disturbance.   All other systems reviewed and are negative.        Objective:    Physical Exam   Constitutional: He is oriented to person, place, and time. He appears well-developed and well-nourished.   HENT:   Head: Normocephalic and atraumatic.   Mouth/Throat: Oropharynx is clear and moist.   Mallampati 4 anatomy   Eyes: Conjunctivae are normal.    Neck: Neck supple. No thyromegaly present.   Cardiovascular: Normal rate and regular rhythm.   Pulmonary/Chest: Effort normal and breath sounds normal.   Lymphadenopathy:     He has no cervical adenopathy.   Neurological: He is alert and oriented to person, place, and time.   Skin: Skin is warm and dry.   Psychiatric: He has a normal mood and affect. His behavior is normal. Judgment and thought content normal.   Nursing note and vitals reviewed.    36/36 days of use.  Greater than 4 hours use 77.8%.  90% pressure 10.1.  AHI of 3.1.  Download reviewed with patient.    ASSESSMENT/PLAN    Bryan was seen today for follow-up.    Diagnoses and all orders for this visit:    Obstructive sleep apnea, adult  -     CPAP Therapy    Hypersomnia with sleep apnea  -     modafinil (PROVIGIL) 200 MG tablet; Take 1 tablet by mouth Daily.    Nocturnal hypoxemia  -     Overnight Sleep Oximetry Study; Future            1. Counseled patient regarding multimodal approach with healthy nutrition, healthy sleep, regular physical activity, social activities, counseling, and medications. Encouraged to practice lateral sleep position. Avoid alcohol and sedatives close to bedtime.  2.   Refill supplies x1 year.  Provigil 200 mg 1 p.o. every morning #30 no refills 1 additional prescription given.  [place] updated today and compliant.  Return to clinic 8 weeks.  Overnight oximetry ordered while wearing CPAP to reassess nocturnal hypoxemia.  Patient will be called with these results.  I have reviewed the results of my evaluation and impression and discussed my recommendations in detail with the patient.      Signed by  PAUL Bishop    November 14, 2019      CC: Erika Vargas MD          No ref. provider found

## 2019-11-25 ENCOUNTER — OFFICE VISIT (OUTPATIENT)
Dept: FAMILY MEDICINE CLINIC | Facility: CLINIC | Age: 64
End: 2019-11-25

## 2019-11-25 VITALS
DIASTOLIC BLOOD PRESSURE: 64 MMHG | BODY MASS INDEX: 28.71 KG/M2 | HEART RATE: 87 BPM | HEIGHT: 73 IN | OXYGEN SATURATION: 97 % | WEIGHT: 216.6 LBS | SYSTOLIC BLOOD PRESSURE: 122 MMHG

## 2019-11-25 DIAGNOSIS — M19.011 ARTHRITIS OF RIGHT ACROMIOCLAVICULAR JOINT: ICD-10-CM

## 2019-11-25 DIAGNOSIS — I25.10 CORONARY ARTERY DISEASE INVOLVING NATIVE HEART WITHOUT ANGINA PECTORIS, UNSPECIFIED VESSEL OR LESION TYPE: ICD-10-CM

## 2019-11-25 DIAGNOSIS — E11.42 TYPE 2 DIABETES MELLITUS WITH DIABETIC POLYNEUROPATHY, WITHOUT LONG-TERM CURRENT USE OF INSULIN (HCC): Primary | ICD-10-CM

## 2019-11-25 DIAGNOSIS — M47.896 OTHER OSTEOARTHRITIS OF SPINE, LUMBAR REGION: ICD-10-CM

## 2019-11-25 PROBLEM — Z79.899 CONTROLLED SUBSTANCE AGREEMENT SIGNED: Status: ACTIVE | Noted: 2019-11-25

## 2019-11-25 PROBLEM — M47.9 SPINAL OSTEOARTHRITIS: Status: ACTIVE | Noted: 2019-11-25

## 2019-11-25 LAB
GLUCOSE BLDC GLUCOMTR-MCNC: 196 MG/DL (ref 70–130)
HBA1C MFR BLD: 7.8 %

## 2019-11-25 PROCEDURE — 99214 OFFICE O/P EST MOD 30 MIN: CPT | Performed by: FAMILY MEDICINE

## 2019-11-25 PROCEDURE — 82947 ASSAY GLUCOSE BLOOD QUANT: CPT | Performed by: FAMILY MEDICINE

## 2019-11-25 PROCEDURE — 83036 HEMOGLOBIN GLYCOSYLATED A1C: CPT | Performed by: FAMILY MEDICINE

## 2019-11-25 RX ORDER — MELOXICAM 7.5 MG/1
15 TABLET ORAL DAILY PRN
Qty: 90 TABLET | Refills: 1 | Status: SHIPPED | OUTPATIENT
Start: 2019-11-25 | End: 2019-11-25 | Stop reason: SDUPTHER

## 2019-11-25 RX ORDER — NITROGLYCERIN 0.4 MG/1
0.4 TABLET SUBLINGUAL
Qty: 30 TABLET | Refills: 0 | Status: SHIPPED | OUTPATIENT
Start: 2019-11-25

## 2019-11-25 RX ORDER — MELOXICAM 7.5 MG/1
7.5-15 TABLET ORAL DAILY PRN
Qty: 180 TABLET | Refills: 1 | Status: SHIPPED | OUTPATIENT
Start: 2019-11-25

## 2019-11-25 NOTE — PROGRESS NOTES
Chief Complaint   Patient presents with   • Back Pain        Back Pain   This is a chronic problem. The problem has been rapidly worsening since onset. The pain is present in the lumbar spine. The pain is severe. The symptoms are aggravated by twisting and sitting. Associated symptoms include chest pain. He has tried heat and NSAIDs for the symptoms.   Arm Pain    The incident occurred 3 to 5 days ago. The pain is present in the right shoulder. Quality: throbbing. Associated symptoms include chest pain. He has tried NSAIDs for the symptoms.   Diabetes   He presents for his follow-up diabetic visit. He has type 2 diabetes mellitus. His disease course has been stable. Associated symptoms include chest pain. Diabetic complications include peripheral neuropathy. Current diabetic treatment includes oral agent (dual therapy) (GLP-1a injection). He is following a generally unhealthy diet. An ACE inhibitor/angiotensin II receptor blocker is being taken.          Center and right sided lower back pain severe. Worse twisting and sitting in chair. 3 days ago worse. He fell twice last month, once carrying laundry bag and wrapped around his leg and fell on face onto street. 2yas later second fall backwards out of tub on loose bath mat.  2 years ago had back problem. Last few days tried heat, helped some. He has taken a couple meloxicam. He had oxycontin from 2011, used a couple he had left over.     He feels twinges every now and then chest pain with rest or activity and he takes NTG, improves after 1 dose.     He continues to eat sweets regularly.    Review of Systems   Cardiovascular: Positive for chest pain.   Musculoskeletal: Positive for arthralgias and back pain.        Current Outpatient Medications on File Prior to Visit   Medication Sig Dispense Refill   • aspirin 81 MG EC tablet Take 1 tablet by mouth Daily.     • atorvastatin (LIPITOR) 40 MG tablet Take 1 tablet by mouth Every Night. 90 tablet 1   • carvedilol (COREG)  12.5 MG tablet Take 1 tablet by mouth 2 (Two) Times a Day With Meals. (Patient taking differently: Take 12.5 mg by mouth Daily.) 180 tablet 3   • cetirizine (zyrTEC) 10 MG tablet Take 10 mg by mouth Daily.     • escitalopram (LEXAPRO) 10 MG tablet Take 1 tablet by mouth Daily. 90 tablet 1   • Evolocumab (REPATHA SURECLICK) 140 MG/ML solution auto-injector Inject  under the skin.     • FARXIGA 10 MG tablet TAKE ONE TABLET BY MOUTH DAILY 90 tablet 0   • ferrous sulfate 325 (65 FE) MG tablet Take 325 mg by mouth.     • glucose blood test strip Use to test blood sugars once daily. 50 each 11   • lisinopril (PRINIVIL,ZESTRIL) 10 MG tablet Take 1 tablet by mouth Daily. 90 tablet 1   • melatonin 3 MG tablet Take 10 mg by mouth.     • metFORMIN (GLUCOPHAGE) 1000 MG tablet Take 1 tablet by mouth 2 (Two) Times a Day With Meals. 180 tablet 1   • modafinil (PROVIGIL) 200 MG tablet Take 1 tablet by mouth Daily. 30 tablet 0   • omeprazole (priLOSEC) 40 MG capsule Take 1 capsule by mouth Daily. 90 capsule 1   • Semaglutide (OZEMPIC) 1 MG/DOSE solution pen-injector Inject 1 mg under the skin into the appropriate area as directed 1 (One) Time Per Week. 4 pen 5   • Unable to find Inject 1 each as directed 1 (One) Time. Med Name: research drug for low testosterone     • [DISCONTINUED] gabapentin (NEURONTIN) 300 MG capsule Take 1 capsule by mouth 4 (Four) Times a Day As Needed (neuropathy). 360 capsule 0   • [DISCONTINUED] meloxicam (MOBIC) 7.5 MG tablet Take 1 tablet by mouth Daily As Needed for Moderate Pain . 90 tablet 1   • [DISCONTINUED] nitroglycerin (NITROSTAT) 0.4 MG SL tablet      • gabapentin (NEURONTIN) 300 MG capsule TAKE ONE CAPSULE BY MOUTH FOUR TIMES A  capsule 0     No current facility-administered medications on file prior to visit.        No Known Allergies    Past Medical History:   Diagnosis Date   • A-fib (CMS/HCC)    • Actinic keratosis    • Adenomatous colon polyp    • Allergic    • Anemia    • Atrial  flutter (CMS/HCC)    • Carpal tunnel syndrome    • Cervicalgia    • Coronary artery disease    • Depression    • Dyslipidemia    • Esophageal stricture    • GERD (gastroesophageal reflux disease)    • Headache, tension-type sometimes   • History of gout    • HL (hearing loss) last few years   • Hyperlipidemia    • Hypertension    • Hypogonadism male    • Left ventricular hypertrophy    • Low back pain    • Memory loss 2-3 years    forgetful   • Migraines    • Nephrolithiasis    • Neuropathy in diabetes (CMS/HCC) 3 years   • Obesity    • Osteoarthritis    • Peripheral neuropathy    • Rosacea    • Sleep apnea i snore a lot   • Tubular adenoma of colon 05/31/2019   • Type 2 diabetes mellitus (CMS/HCC)    • Vision loss constant        Past Surgical History:   Procedure Laterality Date   • APPENDECTOMY     • CARDIAC ABLATION     • CAROTID STENT     • CORONARY ANGIOPLASTY     • CORONARY STENT PLACEMENT     • ESOPHAGEAL DILATATION     • TONSILLECTOMY          Family History   Problem Relation Age of Onset   • Diabetes Mother    • Heart disease Mother    • Hypertension Mother    • Thyroid disease Mother    • Heart disease Father    • Dystonia Son    • Cancer Sister         SPINAL CORD   • Sleep apnea Daughter         Social History     Socioeconomic History   • Marital status: Other     Spouse name: Not on file   • Number of children: 2   • Years of education: Not on file   • Highest education level: Not on file   Occupational History   • Occupation: insurance businessman   Tobacco Use   • Smoking status: Never Smoker   • Smokeless tobacco: Never Used   Substance and Sexual Activity   • Alcohol use: Yes     Types: 2 Cans of beer per week     Comment: occ   • Drug use: No   • Sexual activity: Defer     Partners: Female     Birth control/protection: Abstinence   Social History Narrative    11/17:    Working part time.    .    2 kids.    Lives alone.    Exercise: walks a lot at work 2d/wk    Dental: due    Eye:  "glasses, within last 2yrs        Visit Vitals  /64 (BP Location: Left arm, Patient Position: Sitting, Cuff Size: Adult)   Pulse 87   Ht 185.4 cm (73\")   Wt 98.2 kg (216 lb 9.6 oz)   SpO2 97%   BMI 28.58 kg/m²        Physical Exam   Constitutional: No distress.   Cardiovascular: Normal rate and regular rhythm.   No murmur heard.  Pulmonary/Chest: Effort normal and breath sounds normal.   Musculoskeletal:        Right shoulder: He exhibits decreased range of motion ( mild) and bony tenderness ( AC joint). He exhibits no deformity and no spasm.        Lumbar back: He exhibits no bony tenderness and no spasm.   Psychiatric: He has a normal mood and affect.   Vitals reviewed.      Results for orders placed or performed in visit on 11/25/19   POC Glucose   Result Value Ref Range    Glucose 196 (A) 70 - 130 mg/dL   POC Glycosylated Hemoglobin (Hb A1C)   Result Value Ref Range    Hemoglobin A1C 7.8 %      CMP:  Lab Results   Component Value Date    BUN 13 09/17/2019    CREATININE 0.96 09/17/2019    EGFRIFNONA 79 09/17/2019    BCR 13.5 09/17/2019     09/17/2019    K 4.5 09/17/2019    CO2 25.3 09/17/2019    CALCIUM 9.4 09/17/2019    ALBUMIN 4.50 09/17/2019    BILITOT 0.7 09/17/2019    ALKPHOS 113 09/17/2019    AST 42 (H) 09/17/2019    ALT 33 09/17/2019     Lipid Panel:  Lab Results   Component Value Date    CHOL 104 09/17/2019    TRIG 144 09/17/2019    HDL 42 09/17/2019    VLDL 28.8 09/17/2019    LDL 33 09/17/2019     HbA1c:  Lab Results   Component Value Date    HGBA1C 7.8 11/25/2019    HGBA1C 8.20 (H) 08/06/2019     Microalbumin:  Lab Results   Component Value Date    MICROALBUR 1.4 05/07/2019       EXAMINATION: XR SPINE, LUMBAR, 4+ VIEWS-06/20/2017:      INDICATION: BACK PAIN; M54.9-Dorsalgia, unspecified.      COMPARISON: NONE.     FINDINGS:   1. Pelvic bone structures are intact. There is minimal narrowing of the  femoral acetabular joint spaces without bone on bone.  2. Pedicles are intact. SI joints are " normal. Oblique views demonstrate  no evidence of pars defect. There is no facet fracture. There is facet  sclerosis and degenerative change.  3. Calcification of the abdominal aorta is noted without obvious  aneurysm. There is no vertebral fracture of the lumbar vertebral bodies  and there is no evidence of subluxation or erosion. The disc spaces are  well preserved.         IMPRESSION:  1.  Lumbar arthropathy with bridging osteophytes.  2.  No evidence of fracture or subluxation. Although there is facet  sclerosis and degenerative disease, there is no pars defect or facet  fracture. Pelvis and hips are unremarkable.     D:  06/20/2017      Right shoulder pain about 3 days ago. Throbbing pain, decreased ROM.     EXAMINATION: XR SHOULDER 2+ VW RIGHT-, XR KNEE 1 OR 2 VW RIGHT-, XR TOE  2+ VW RIGHT-, XR ELBOW 2 VW RIGHT-      INDICATION: S49.91XA-Unspecified injury of right shoulder and upper arm,  initial encounter; fall.      COMPARISON: 04/18/2015.     FINDINGS: Normal anatomic alignment of the glenohumeral joint is  preserved. There is some degenerative change of the acromioclavicular  joint. Dystrophic calcification is present in the supraspinatus tendon.  The remaining osseous structures are normal.         IMPRESSION:  1. No acute osseous abnormality.    2. Degenerative changes of the acromioclavicular joint and humeral head.     RIGHT TOES: Normal anatomic alignment of the metatarsophalangeal joints  and interphalangeal joints are preserved. There is minimal narrowing of  the joint space at the first metatarsophalangeal joint. There is no  significant soft tissue swelling.     IMPRESSION: Mild degenerative change of the first metatarsophalangeal  joint.     RIGHT KNEE: The tibial spines are prominent. The bearing surfaces of the  femoral condyles are smooth. Vascular calcifications are noted. The  undersurface of the patella is normal. There is no significant joint  effusion. There is some dystrophic  calcification at the tibial tubercle.     IMPRESSION: Mild degenerative changes of the knee joint.     RIGHT ELBOW: Normal anatomic alignment of the elbow joint is preserved.  There is minimal fluid in the anterior fat pad. The posterior fat pad is  not visible. There are no areas of osteolysis. There is some dystrophic  calcification along the lateral epicondyle.     IMPRESSION:   1. No acute osseous abnormality.  2. Mild degenerative change around the lateral epicondyle.     D:  10/17/2016    Bryan was seen today for back pain.    Diagnoses and all orders for this visit:    Type 2 diabetes mellitus with diabetic polyneuropathy, without long-term current use of insulin (CMS/[facility])  -     POC Glucose  -     POC Glycosylated Hemoglobin (Hb A1C)  Uncontrolled. He doesn't check glucose at home. Non-compliant with diet. Continue farxiga, metformin, Ozempic.   Continue gabapentin, not due for refill today.  The treatment plan includes a controlled substance.  Controlled Substance Medication Agreement signed and on file.  Risks, benefits, and alternative treatments reviewed.  GUDELIA report has been reviewed and scanned into the patient’s chart.   Other osteoarthritis of spine, lumbar region  -     meloxicam (MOBIC) 7.5 MG tablet; Take 2 tablets by mouth Daily As Needed for Moderate Pain .  Reviewed xrays.  Increase meloxicam to 15 mg as needed for pain.  Recommend physical therapy but he declined.  Arthritis of right acromioclavicular joint  -     meloxicam (MOBIC) 7.5 MG tablet; Take 2 tablets by mouth Daily As Needed for Moderate Pain .  Reviewed xrays.  Increase meloxicam to 15 mg as needed for pain.  Recommend physical therapy but he declined.  Coronary artery disease involving native heart without angina pectoris, unspecified vessel or lesion type  -     nitroglycerin (NITROSTAT) 0.4 MG SL tablet; Place 1 tablet under the tongue Every 5 (Five) Minutes As Needed for Chest Pain.  Continue nitroglycerin as needed.   Continue statin, aspirin, and beta-blocker.      Return in about 3 months (around 2/25/2020) for Office visit.    Dr. Erika Landrum

## 2019-11-27 ENCOUNTER — HOSPITAL ENCOUNTER (EMERGENCY)
Facility: HOSPITAL | Age: 64
Discharge: HOME | End: 2019-11-27
Attending: EMERGENCY MEDICINE | Admitting: EMERGENCY MEDICINE

## 2019-11-27 VITALS
TEMPERATURE: 98.2 F | WEIGHT: 210 LBS | HEIGHT: 73 IN | DIASTOLIC BLOOD PRESSURE: 82 MMHG | OXYGEN SATURATION: 99 % | BODY MASS INDEX: 27.83 KG/M2 | RESPIRATION RATE: 16 BRPM | SYSTOLIC BLOOD PRESSURE: 142 MMHG | HEART RATE: 77 BPM

## 2019-11-27 DIAGNOSIS — Z86.79 HISTORY OF CORONARY ARTERY DISEASE: ICD-10-CM

## 2019-11-27 DIAGNOSIS — Z86.79 HISTORY OF ATRIAL FIBRILLATION: ICD-10-CM

## 2019-11-27 DIAGNOSIS — S61.211A LACERATION OF LEFT INDEX FINGER WITHOUT FOREIGN BODY WITHOUT DAMAGE TO NAIL, INITIAL ENCOUNTER: Primary | ICD-10-CM

## 2019-11-27 PROCEDURE — 99283 EMERGENCY DEPT VISIT LOW MDM: CPT

## 2019-11-27 PROCEDURE — 90715 TDAP VACCINE 7 YRS/> IM: CPT | Performed by: EMERGENCY MEDICINE

## 2019-11-27 PROCEDURE — 25010000002 TDAP 5-2.5-18.5 LF-MCG/0.5 SUSPENSION: Performed by: EMERGENCY MEDICINE

## 2019-11-27 PROCEDURE — 90471 IMMUNIZATION ADMIN: CPT | Performed by: EMERGENCY MEDICINE

## 2019-11-27 PROCEDURE — 25010000003 LIDOCAINE 1 % SOLUTION

## 2019-11-27 RX ORDER — BACITRACIN, NEOMYCIN, POLYMYXIN B 400; 3.5; 5 [USP'U]/G; MG/G; [USP'U]/G
1 OINTMENT TOPICAL ONCE
Status: DISCONTINUED | OUTPATIENT
Start: 2019-11-27 | End: 2019-11-28 | Stop reason: HOSPADM

## 2019-11-27 RX ORDER — LIDOCAINE HYDROCHLORIDE 10 MG/ML
10 INJECTION, SOLUTION EPIDURAL; INFILTRATION; INTRACAUDAL; PERINEURAL ONCE
Status: DISCONTINUED | OUTPATIENT
Start: 2019-11-27 | End: 2019-11-28 | Stop reason: HOSPADM

## 2019-11-27 RX ADMIN — TETANUS TOXOID, REDUCED DIPHTHERIA TOXOID AND ACELLULAR PERTUSSIS VACCINE, ADSORBED 0.5 ML: 5; 2.5; 8; 8; 2.5 SUSPENSION INTRAMUSCULAR at 22:08

## 2019-12-02 ENCOUNTER — TELEPHONE (OUTPATIENT)
Dept: GASTROENTEROLOGY | Facility: CLINIC | Age: 64
End: 2019-12-02

## 2019-12-02 NOTE — TELEPHONE ENCOUNTER
Patient is called saying he is coughing of mucus, states he has choked couple of times. He stated that he was told that if the EGD did not help to come back and you would do another one. Please advise if you are okay with doing another EGD.     Thanks

## 2019-12-02 NOTE — TELEPHONE ENCOUNTER
I do not recommend scheduling EGD while coughing up mucus. Instead I would recommend calling pcp for management of possible pneumonia.     If he is having trouble with choking, I would offer repeat EGD AFTER management of coughing up of mucus by his pcp. I would recommend at least 10 days after completion of any antibiotics.

## 2019-12-04 NOTE — TELEPHONE ENCOUNTER
Patient states that he has seen his pcp about 6 weeks. He does not cough of phelm everyday. He states that his pcp checked his lungs and did not mention anything that she found concerning. He states he has been doing this for years. He says that biggest problem is him getting food stuck. He did not tell me that the first time. He just stated he get choked. Patient states he really needs an EGD with stretching.

## 2019-12-05 ENCOUNTER — HOSPITAL ENCOUNTER (EMERGENCY)
Facility: HOSPITAL | Age: 64
Discharge: HOME | End: 2019-12-05

## 2019-12-05 VITALS
RESPIRATION RATE: 18 BRPM | SYSTOLIC BLOOD PRESSURE: 127 MMHG | HEART RATE: 89 BPM | OXYGEN SATURATION: 97 % | DIASTOLIC BLOOD PRESSURE: 64 MMHG | TEMPERATURE: 97.7 F

## 2019-12-05 PROCEDURE — 99201: CPT

## 2019-12-13 DIAGNOSIS — G47.30 HYPERSOMNIA WITH SLEEP APNEA: ICD-10-CM

## 2019-12-13 DIAGNOSIS — G47.10 HYPERSOMNIA WITH SLEEP APNEA: ICD-10-CM

## 2019-12-13 RX ORDER — MODAFINIL 200 MG/1
TABLET ORAL
Qty: 30 TABLET | Refills: 0 | Status: SHIPPED | OUTPATIENT
Start: 2019-12-13 | End: 2020-01-09 | Stop reason: DRUGHIGH

## 2019-12-18 ENCOUNTER — OUTSIDE FACILITY SERVICE (OUTPATIENT)
Dept: GASTROENTEROLOGY | Facility: CLINIC | Age: 64
End: 2019-12-18

## 2019-12-18 ENCOUNTER — LAB REQUISITION (OUTPATIENT)
Dept: LAB | Facility: HOSPITAL | Age: 64
End: 2019-12-18

## 2019-12-18 DIAGNOSIS — R10.13 EPIGASTRIC PAIN: ICD-10-CM

## 2019-12-18 PROCEDURE — 88342 IMHCHEM/IMCYTCHM 1ST ANTB: CPT | Performed by: INTERNAL MEDICINE

## 2019-12-18 PROCEDURE — 43239 EGD BIOPSY SINGLE/MULTIPLE: CPT | Performed by: INTERNAL MEDICINE

## 2019-12-18 PROCEDURE — 88305 TISSUE EXAM BY PATHOLOGIST: CPT | Performed by: INTERNAL MEDICINE

## 2019-12-18 PROCEDURE — 43249 ESOPH EGD DILATION <30 MM: CPT | Performed by: INTERNAL MEDICINE

## 2019-12-20 LAB
CYTO UR: NORMAL
LAB AP CASE REPORT: NORMAL
LAB AP CLINICAL INFORMATION: NORMAL
LAB AP DIAGNOSIS COMMENT: NORMAL
PATH REPORT.FINAL DX SPEC: NORMAL
PATH REPORT.GROSS SPEC: NORMAL

## 2019-12-30 ENCOUNTER — OUTSIDE FACILITY SERVICE (OUTPATIENT)
Dept: GASTROENTEROLOGY | Facility: CLINIC | Age: 64
End: 2019-12-30

## 2019-12-30 ENCOUNTER — LAB REQUISITION (OUTPATIENT)
Dept: LAB | Facility: HOSPITAL | Age: 64
End: 2019-12-30

## 2019-12-30 DIAGNOSIS — R10.13 EPIGASTRIC PAIN: ICD-10-CM

## 2019-12-30 PROCEDURE — 88312 SPECIAL STAINS GROUP 1: CPT | Performed by: INTERNAL MEDICINE

## 2019-12-30 PROCEDURE — 43239 EGD BIOPSY SINGLE/MULTIPLE: CPT | Performed by: INTERNAL MEDICINE

## 2019-12-30 PROCEDURE — 43249 ESOPH EGD DILATION <30 MM: CPT | Performed by: INTERNAL MEDICINE

## 2019-12-30 PROCEDURE — 88305 TISSUE EXAM BY PATHOLOGIST: CPT | Performed by: INTERNAL MEDICINE

## 2019-12-31 LAB
CYTO UR: NORMAL
LAB AP CASE REPORT: NORMAL
LAB AP CLINICAL INFORMATION: NORMAL
LAB AP DIAGNOSIS COMMENT: NORMAL
PATH REPORT.ADDENDUM SPEC: NORMAL
PATH REPORT.FINAL DX SPEC: NORMAL
PATH REPORT.GROSS SPEC: NORMAL

## 2020-01-08 ENCOUNTER — TELEPHONE (OUTPATIENT)
Dept: FAMILY MEDICINE CLINIC | Facility: CLINIC | Age: 65
End: 2020-01-08

## 2020-01-08 DIAGNOSIS — E11.42 TYPE 2 DIABETES MELLITUS WITH DIABETIC POLYNEUROPATHY, WITHOUT LONG-TERM CURRENT USE OF INSULIN (HCC): ICD-10-CM

## 2020-01-08 DIAGNOSIS — I10 ESSENTIAL HYPERTENSION: ICD-10-CM

## 2020-01-08 RX ORDER — LISINOPRIL 10 MG/1
10 TABLET ORAL DAILY
Qty: 90 TABLET | Refills: 1 | Status: SHIPPED | OUTPATIENT
Start: 2020-01-08 | End: 2020-07-08

## 2020-01-08 NOTE — TELEPHONE ENCOUNTER
Patient needs refills on 2 medications:       lisinopril (PRINIVIL,ZESTRIL) 10 MG tablet  amLODIPine (NORVASC) 2.5 MG tablet       Pt confirms the [address]

## 2020-01-08 NOTE — TELEPHONE ENCOUNTER
Please verify with pharmacy.  Did he mistakenly mean that he needed a refill of atorvastatin?  He was seen by cardiology on 9/13/2019 and they discontinued amlodipine.

## 2020-01-08 NOTE — TELEPHONE ENCOUNTER
Refill sent in for lisinopril. Please advise on amlodipine, is not listed in current medications. Looks like it was marked as therapy completed.

## 2020-01-09 ENCOUNTER — OFFICE VISIT (OUTPATIENT)
Dept: SLEEP MEDICINE | Facility: HOSPITAL | Age: 65
End: 2020-01-09

## 2020-01-09 VITALS
DIASTOLIC BLOOD PRESSURE: 66 MMHG | HEIGHT: 73 IN | BODY MASS INDEX: 28.63 KG/M2 | WEIGHT: 216 LBS | OXYGEN SATURATION: 98 % | HEART RATE: 87 BPM | SYSTOLIC BLOOD PRESSURE: 109 MMHG

## 2020-01-09 DIAGNOSIS — G47.34 NOCTURNAL HYPOXEMIA: ICD-10-CM

## 2020-01-09 DIAGNOSIS — G47.33 OSA (OBSTRUCTIVE SLEEP APNEA): Primary | ICD-10-CM

## 2020-01-09 DIAGNOSIS — G47.10 HYPERSOMNIA WITH SLEEP APNEA: ICD-10-CM

## 2020-01-09 DIAGNOSIS — G47.30 HYPERSOMNIA WITH SLEEP APNEA: ICD-10-CM

## 2020-01-09 PROCEDURE — 99213 OFFICE O/P EST LOW 20 MIN: CPT | Performed by: NURSE PRACTITIONER

## 2020-01-09 RX ORDER — MODAFINIL 200 MG/1
TABLET ORAL
Qty: 60 TABLET | Refills: 0 | Status: SHIPPED | OUTPATIENT
Start: 2020-01-09 | End: 2020-02-10

## 2020-01-09 NOTE — PROGRESS NOTES
Chief Complaint:   Chief Complaint   Patient presents with   • Follow-up       HPI:    Bryan Oswald is a 64 y.o. male here for follow-up of hypersomnia with sleep apnea.  Patient was last seen 11/14/2019 and started on Provigil 200 mg p.o. every morning as he was not feeling rested even though he was compliant with CPAP therapy.  Patient states he can tell very little difference on Provigil 200 mg.  Patient will take and this will keep him feeling awake for a few hours and then he does feel excessively sleepy again.  Patient is sleeping 5 to 7 hours nightly and initially feels refreshed upon awakening.  Patient has an [place] score of 13/24.  Patient is amendable to increasing to twice daily before discontinuing this medication and starting a different one.        Current medications are:   Current Outpatient Medications:   •  aspirin 81 MG EC tablet, Take 1 tablet by mouth Daily., Disp: , Rfl:   •  atorvastatin (LIPITOR) 40 MG tablet, Take 1 tablet by mouth Every Night., Disp: 90 tablet, Rfl: 1  •  carvedilol (COREG) 12.5 MG tablet, Take 1 tablet by mouth 2 (Two) Times a Day With Meals. (Patient taking differently: Take 12.5 mg by mouth Daily.), Disp: 180 tablet, Rfl: 3  •  cetirizine (zyrTEC) 10 MG tablet, Take 10 mg by mouth Daily., Disp: , Rfl:   •  escitalopram (LEXAPRO) 10 MG tablet, Take 1 tablet by mouth Daily., Disp: 90 tablet, Rfl: 1  •  Evolocumab (REPATHA SURECLICK) 140 MG/ML solution auto-injector, Inject  under the skin., Disp: , Rfl:   •  FARXIGA 10 MG tablet, TAKE ONE TABLET BY MOUTH DAILY, Disp: 90 tablet, Rfl: 0  •  ferrous sulfate 325 (65 FE) MG tablet, Take 325 mg by mouth., Disp: , Rfl:   •  gabapentin (NEURONTIN) 300 MG capsule, TAKE ONE CAPSULE BY MOUTH FOUR TIMES A DAY, Disp: 360 capsule, Rfl: 0  •  glucose blood test strip, Use to test blood sugars once daily., Disp: 50 each, Rfl: 11  •  lisinopril (PRINIVIL,ZESTRIL) 10 MG tablet, Take 1 tablet by mouth Daily., Disp: 90 tablet, Rfl:  1  •  melatonin 3 MG tablet, Take 10 mg by mouth., Disp: , Rfl:   •  meloxicam (MOBIC) 7.5 MG tablet, Take 1-2 tablets by mouth Daily As Needed for Moderate Pain ., Disp: 180 tablet, Rfl: 1  •  metFORMIN (GLUCOPHAGE) 1000 MG tablet, Take 1 tablet by mouth 2 (Two) Times a Day With Meals., Disp: 180 tablet, Rfl: 1  •  nitroglycerin (NITROSTAT) 0.4 MG SL tablet, Place 1 tablet under the tongue Every 5 (Five) Minutes As Needed for Chest Pain., Disp: 30 tablet, Rfl: 0  •  omeprazole (priLOSEC) 40 MG capsule, Take 1 capsule by mouth Daily., Disp: 90 capsule, Rfl: 1  •  Semaglutide (OZEMPIC) 1 MG/DOSE solution pen-injector, Inject 1 mg under the skin into the appropriate area as directed 1 (One) Time Per Week., Disp: 4 pen, Rfl: 5  •  Unable to find, Inject 1 each as directed 1 (One) Time. Med Name: research drug for low testosterone, Disp: , Rfl:   •  modafinil (PROVIGIL) 200 MG tablet, One po twice daily, Disp: 60 tablet, Rfl: 0.      The patient's relevant past medical, surgical, family and social history were reviewed and updated in Epic as appropriate.       Review of Systems   Constitutional: Positive for fatigue.   HENT: Positive for hearing loss and tinnitus.    Eyes: Positive for visual disturbance.   Respiratory: Positive for apnea.    Cardiovascular: Positive for palpitations.   Gastrointestinal: Positive for constipation.   Endocrine: Positive for polydipsia and polyuria.   Musculoskeletal: Positive for arthralgias, back pain and myalgias.   Neurological: Positive for dizziness and tremors.   Psychiatric/Behavioral: Positive for sleep disturbance.   All other systems reviewed and are negative.        Objective:    Physical Exam   Constitutional: He is oriented to person, place, and time. He appears well-developed and well-nourished.   HENT:   Head: Normocephalic and atraumatic.   Mouth/Throat: Oropharynx is clear and moist.   Mallampati 4 anatomy   Eyes: Conjunctivae are normal.   Neck: Neck supple.    Cardiovascular: Normal rate and regular rhythm.   Pulmonary/Chest: Effort normal and breath sounds normal.   Neurological: He is alert and oriented to person, place, and time.   Skin: Skin is warm and dry.   Psychiatric: He has a normal mood and affect. His behavior is normal. Judgment and thought content normal.   Nursing note and vitals reviewed.    55/62 days of use.  Greater than 4-hour use 71%.  90% pressure 10.9.  AHI of 3.5.  Download reviewed with patient.    ASSESSMENT/PLAN    Bryan was seen today for follow-up.    Diagnoses and all orders for this visit:    KIM (obstructive sleep apnea)  -     CPAP Therapy  -     Overnight Sleep Oximetry Study; Future    Nocturnal hypoxemia  -     Overnight Sleep Oximetry Study; Future    Hypersomnia with sleep apnea  -     modafinil (PROVIGIL) 200 MG tablet; One po twice daily            1. Counseled patient regarding multimodal approach with healthy nutrition, healthy sleep, regular physical activity, social activities, counseling, and medications. Encouraged to practice lateral sleep position. Avoid alcohol and sedatives close to bedtime.  2. Continue CPAP overnight oximetry to be ordered while wearing CPAP to reassess nocturnal hypoxemia as patient did get the oximetry in the mail but the incident back without doing the test.  Provigil increased to 200 mg twice daily #60 with no refills.  This is been E scribed.  I will see patient back in 3 months.  I have reviewed the results of my evaluation and impression and discussed my recommendations in detail with the patient.      Signed by  PAUL Bishop    January 9, 2020      CC: Erika Vargas MD          No ref. provider found

## 2020-01-10 NOTE — TELEPHONE ENCOUNTER
Pt requested refill on ozempic via fax form.    Refills sent in to patient pharmacy as requested

## 2020-01-10 NOTE — TELEPHONE ENCOUNTER
Spoke with patient regarding his medication refill requests. He did truly want the amlodipine filled but I had explained to him that from our records his cardiologist d/c this back in September. The patient was unaware and stated that he knew that another med was d/c. I explained that we just want to make sure he is on the right medication that if he has further questions to contact cardiology. The patient verbalized understanding and did not have any further questions.

## 2020-01-16 ENCOUNTER — TELEPHONE (OUTPATIENT)
Dept: SLEEP MEDICINE | Facility: HOSPITAL | Age: 65
End: 2020-01-16

## 2020-01-16 NOTE — TELEPHONE ENCOUNTER
RECEIVED CALL FROM RAQUEL AT [facility] STATING THAT SHE HAS RECEIVED AN ORDER FOR PULSE OX ON THE PATIENT, WHICH THERE WAS AN ORDER SENT IN November AND PULSE OX WAS SENT TO THE PATIENT BUT HE HAS NOT COMPLETED. RAQUEL HAS SPOKEN WITH THE PATIENT AND HE SAYS THAT HE WILL DO IT.    PATIENT ACKNOWLEDGED THAT HE IS DOES HAVE THE PULSE OX AND TESTING NEEDS TO BE COMPLETED

## 2020-01-23 DIAGNOSIS — E11.42 TYPE 2 DIABETES MELLITUS WITH DIABETIC POLYNEUROPATHY, WITHOUT LONG-TERM CURRENT USE OF INSULIN (HCC): ICD-10-CM

## 2020-01-23 RX ORDER — GABAPENTIN 300 MG/1
CAPSULE ORAL
Qty: 360 CAPSULE | Refills: 0 | Status: SHIPPED | OUTPATIENT
Start: 2020-01-23 | End: 2020-04-20

## 2020-01-23 NOTE — TELEPHONE ENCOUNTER
Last fill:10/23/2019  Last office visit:11/25/2019  Next office visit:02/25/2020  Date of last Isaiah:1/25/2019  CSA up-to-date? YES   Date of last UDS: NONE ON FILE   UDS consistent:

## 2020-01-29 DIAGNOSIS — F33.1 MAJOR DEPRESSIVE DISORDER, RECURRENT EPISODE, MODERATE (HCC): ICD-10-CM

## 2020-01-29 RX ORDER — ESCITALOPRAM OXALATE 10 MG/1
TABLET ORAL
Qty: 90 TABLET | Refills: 0 | Status: SHIPPED | OUTPATIENT
Start: 2020-01-29 | End: 2020-03-03

## 2020-02-07 DIAGNOSIS — E11.42 TYPE 2 DIABETES MELLITUS WITH DIABETIC POLYNEUROPATHY, WITHOUT LONG-TERM CURRENT USE OF INSULIN (HCC): ICD-10-CM

## 2020-02-07 RX ORDER — DAPAGLIFLOZIN 10 MG/1
TABLET, FILM COATED ORAL
Qty: 90 TABLET | Refills: 0 | Status: SHIPPED | OUTPATIENT
Start: 2020-02-07 | End: 2020-05-07

## 2020-02-09 DIAGNOSIS — G47.30 HYPERSOMNIA WITH SLEEP APNEA: ICD-10-CM

## 2020-02-09 DIAGNOSIS — G47.10 HYPERSOMNIA WITH SLEEP APNEA: ICD-10-CM

## 2020-02-09 DIAGNOSIS — K22.2 ESOPHAGEAL STRICTURE: ICD-10-CM

## 2020-02-09 DIAGNOSIS — K21.9 GASTROESOPHAGEAL REFLUX DISEASE, ESOPHAGITIS PRESENCE NOT SPECIFIED: ICD-10-CM

## 2020-02-10 RX ORDER — MODAFINIL 200 MG/1
TABLET ORAL
Qty: 60 TABLET | Refills: 0 | Status: SHIPPED | OUTPATIENT
Start: 2020-02-10 | End: 2020-03-09

## 2020-02-10 RX ORDER — OMEPRAZOLE 40 MG/1
CAPSULE, DELAYED RELEASE ORAL
Qty: 30 CAPSULE | Refills: 2 | Status: SHIPPED | OUTPATIENT
Start: 2020-02-10 | End: 2020-05-07

## 2020-02-25 ENCOUNTER — OFFICE VISIT (OUTPATIENT)
Dept: FAMILY MEDICINE CLINIC | Facility: CLINIC | Age: 65
End: 2020-02-25

## 2020-02-25 VITALS
DIASTOLIC BLOOD PRESSURE: 68 MMHG | HEIGHT: 73 IN | SYSTOLIC BLOOD PRESSURE: 122 MMHG | HEART RATE: 94 BPM | BODY MASS INDEX: 28.04 KG/M2 | OXYGEN SATURATION: 95 % | WEIGHT: 211.6 LBS

## 2020-02-25 DIAGNOSIS — N52.9 ERECTILE DYSFUNCTION, UNSPECIFIED ERECTILE DYSFUNCTION TYPE: ICD-10-CM

## 2020-02-25 DIAGNOSIS — M17.10 PRIMARY OSTEOARTHRITIS OF KNEE, UNSPECIFIED LATERALITY: ICD-10-CM

## 2020-02-25 DIAGNOSIS — Z51.81 THERAPEUTIC DRUG MONITORING: ICD-10-CM

## 2020-02-25 DIAGNOSIS — M19.011 ARTHRITIS OF RIGHT ACROMIOCLAVICULAR JOINT: ICD-10-CM

## 2020-02-25 DIAGNOSIS — R53.82 CHRONIC FATIGUE: ICD-10-CM

## 2020-02-25 DIAGNOSIS — E11.42 TYPE 2 DIABETES MELLITUS WITH DIABETIC POLYNEUROPATHY, WITHOUT LONG-TERM CURRENT USE OF INSULIN (HCC): Primary | ICD-10-CM

## 2020-02-25 LAB
GLUCOSE BLDC GLUCOMTR-MCNC: 252 MG/DL (ref 70–130)
HBA1C MFR BLD: 8.6 %

## 2020-02-25 PROCEDURE — 82947 ASSAY GLUCOSE BLOOD QUANT: CPT | Performed by: FAMILY MEDICINE

## 2020-02-25 PROCEDURE — 99214 OFFICE O/P EST MOD 30 MIN: CPT | Performed by: FAMILY MEDICINE

## 2020-02-25 PROCEDURE — 83036 HEMOGLOBIN GLYCOSYLATED A1C: CPT | Performed by: FAMILY MEDICINE

## 2020-02-25 NOTE — PROGRESS NOTES
"Chief Complaint   Patient presents with   • Diabetes   • Shoulder Pain        Diabetes   He presents for his follow-up diabetic visit. He has type 2 diabetes mellitus. Hypoglycemia symptoms include dizziness. Associated symptoms include fatigue. Pertinent negatives for diabetes include no blurred vision, no chest pain and no polyuria. There are no hypoglycemic complications. Diabetic complications include peripheral neuropathy. Risk factors for coronary artery disease include diabetes mellitus, dyslipidemia, hypertension and male sex. Current diabetic treatment includes oral agent (dual therapy), insulin injections and diet. He is compliant with treatment all of the time. His weight is stable. He is following a generally unhealthy diet. When asked about meal planning, he reported none. He never participates in exercise. An ACE inhibitor/angiotensin II receptor blocker is being taken. Eye exam is current.   Arm Pain    The pain is present in the right shoulder. The quality of the pain is described as aching. The pain radiates to the right arm. The pain is at a severity of 7/10. Associated symptoms include numbness and tingling. Pertinent negatives include no chest pain. Exacerbated by: moving at night on a pillow on right arm. He has tried acetaminophen and NSAIDs for the symptoms.   Leg Pain    There was no injury mechanism. The pain is present in the left knee. The quality of the pain is described as aching and shooting. Associated symptoms include numbness and tingling. The symptoms are aggravated by movement, weight bearing and palpation. He has tried acetaminophen and NSAIDs for the symptoms.      He reports his diabetes is \"terrible he doesn't do s* for it. I don't watch my diet, I don't exercise, I figured I'm going to diet of something sometimes anyways.\" He does not check his blood sugar at home, and takes getting stuck and the time it takes to do it. He eats a lot of chocolate. Admits he needs to lose 10-15 " pounds of weight. He does have some dizziness when he bends over and gets back up.    He also reports he is having some memory problems as well. He will think of something he needs to do then in five minutes later he forgets. He reports he has some things he wants to tell his daughter while over the phone and can't recall. He has short term forgetfulness, which has declined over the years. He also has issues with recalling people's names at times.    He reports his right shoulder continues to have pain. He also reports pain in his left knee. He reports he takes the Mobic when he needs it, usually once a week, sometimes twice per day, varying pattern, which does help at times.  He does not take the medication with him places, which he admits he probably should.  He takes Tylenol most of the time. He reports his left knee has been     He also reports he has headaches in the morning and lots of sleepiness during the day. He wears a CPAP at night some times, not often, some nights between 6-8 hours, other nights about 4 hours, but does wear it nightly. He does take the Provigil daily.         Review of Systems   Constitutional: Positive for fatigue.   Eyes: Negative for blurred vision.   Respiratory: Negative for cough and shortness of breath.    Cardiovascular: Negative for chest pain and palpitations.   Gastrointestinal: Positive for diarrhea. Negative for nausea and vomiting.   Endocrine: Negative for polyuria.   Genitourinary: Positive for nocturia (2-3 times per night) and erectile dysfunction. Negative for dysuria and hematuria.   Neurological: Positive for dizziness, tingling, numbness and memory problem.   Psychiatric/Behavioral: Positive for sleep disturbance.        Current Outpatient Medications on File Prior to Visit   Medication Sig Dispense Refill   • aspirin 81 MG EC tablet Take 1 tablet by mouth Daily.     • atorvastatin (LIPITOR) 40 MG tablet Take 1 tablet by mouth Every Night. 90 tablet 1   •  carvedilol (COREG) 12.5 MG tablet Take 1 tablet by mouth 2 (Two) Times a Day With Meals. (Patient taking differently: Take 12.5 mg by mouth Daily.) 180 tablet 3   • cetirizine (zyrTEC) 10 MG tablet Take 10 mg by mouth Daily.     • escitalopram (LEXAPRO) 10 MG tablet TAKE ONE TABLET BY MOUTH DAILY 90 tablet 0   • Evolocumab (REPATHA SURECLICK) 140 MG/ML solution auto-injector Inject  under the skin.     • FARXIGA 10 MG tablet TAKE ONE TABLET BY MOUTH DAILY 90 tablet 0   • ferrous sulfate 325 (65 FE) MG tablet Take 325 mg by mouth.     • gabapentin (NEURONTIN) 300 MG capsule TAKE ONE CAPSULE BY MOUTH FOUR TIMES A  capsule 0   • glucose blood test strip Use to test blood sugars once daily. 50 each 11   • lisinopril (PRINIVIL,ZESTRIL) 10 MG tablet Take 1 tablet by mouth Daily. 90 tablet 1   • melatonin 3 MG tablet Take 10 mg by mouth.     • meloxicam (MOBIC) 7.5 MG tablet Take 1-2 tablets by mouth Daily As Needed for Moderate Pain . 180 tablet 1   • metFORMIN (GLUCOPHAGE) 1000 MG tablet Take 1 tablet by mouth 2 (Two) Times a Day With Meals. 180 tablet 1   • modafinil (PROVIGIL) 200 MG tablet TAKE ONE TABLET BY MOUTH TWICE A DAY 60 tablet 0   • nitroglycerin (NITROSTAT) 0.4 MG SL tablet Place 1 tablet under the tongue Every 5 (Five) Minutes As Needed for Chest Pain. 30 tablet 0   • omeprazole (priLOSEC) 40 MG capsule TAKE ONE CAPSULE BY MOUTH DAILY 30 capsule 2   • Semaglutide, 1 MG/DOSE, (OZEMPIC, 1 MG/DOSE,) 2 MG/1.5ML solution pen-injector Inject 1 mg under the skin into the appropriate area as directed 1 (One) Time Per Week. 4 pen 5   • Unable to find Inject 1 each as directed 1 (One) Time. Med Name: research drug for low testosterone       No current facility-administered medications on file prior to visit.        No Known Allergies    Past Medical History:   Diagnosis Date   • A-fib (CMS/HCC)    • Actinic keratosis    • Adenomatous colon polyp    • Allergic    • Anemia    • Atrial flutter (CMS/HCC)    •  Carpal tunnel syndrome    • Cervicalgia    • Coronary artery disease    • Depression    • Dyslipidemia    • Esophageal stricture    • GERD (gastroesophageal reflux disease)    • Headache, tension-type sometimes   • History of gout    • HL (hearing loss) last few years   • Hyperlipidemia    • Hypertension    • Hypogonadism male    • Left ventricular hypertrophy    • Low back pain    • Memory loss 2-3 years    forgetful   • Migraines    • Nephrolithiasis    • Neuropathy in diabetes (CMS/HCC) 3 years   • Obesity    • Osteoarthritis    • Peripheral neuropathy    • Rosacea    • Sleep apnea i snore a lot   • Tubular adenoma of colon 05/31/2019   • Type 2 diabetes mellitus (CMS/HCC)    • Vision loss constant        Past Surgical History:   Procedure Laterality Date   • APPENDECTOMY     • CARDIAC ABLATION     • CAROTID STENT     • CORONARY ANGIOPLASTY     • CORONARY STENT PLACEMENT     • ESOPHAGEAL DILATATION     • TONSILLECTOMY          Family History   Problem Relation Age of Onset   • Diabetes Mother    • Heart disease Mother    • Hypertension Mother    • Thyroid disease Mother    • Heart disease Father    • Dystonia Son    • Cancer Sister         SPINAL CORD   • Sleep apnea Daughter         Social History     Socioeconomic History   • Marital status: Other     Spouse name: Not on file   • Number of children: 2   • Years of education: Not on file   • Highest education level: Not on file   Occupational History   • Occupation: insurance businessman   Tobacco Use   • Smoking status: Never Smoker   • Smokeless tobacco: Never Used   Substance and Sexual Activity   • Alcohol use: Yes     Types: 2 Cans of beer per week     Comment: occ   • Drug use: No   • Sexual activity: Defer     Partners: Female     Birth control/protection: Abstinence   Social History Narrative    11/17:    Working part time.    .    2 kids.    Lives alone.    Exercise: walks a lot at work 2d/wk    Dental: due    Eye: glasses, within last 2yrs     "    Visit Vitals  /68 (BP Location: Right arm, Patient Position: Sitting, Cuff Size: Adult)   Pulse 94   Ht 185.4 cm (73\")   Wt 96 kg (211 lb 9.6 oz)   SpO2 95%   BMI 27.92 kg/m²        Physical Exam   Constitutional: No distress.   Cardiovascular: Normal rate and regular rhythm.   No murmur heard.  Pulmonary/Chest: Effort normal and breath sounds normal.   Musculoskeletal:        Right shoulder: He exhibits bony tenderness ( AC).   Psychiatric: He has a normal mood and affect.   Vitals reviewed.      Results for orders placed or performed in visit on 02/25/20   POC Glycosylated Hemoglobin (Hb A1C)   Result Value Ref Range    Hemoglobin A1C 8.6 %   POC Glucose   Result Value Ref Range    Glucose 252 (A) 70 - 130 mg/dL      CMP:  Lab Results   Component Value Date    BUN 13 09/17/2019    CREATININE 0.96 09/17/2019    EGFRIFNONA 79 09/17/2019    BCR 13.5 09/17/2019     09/17/2019    K 4.5 09/17/2019    CO2 25.3 09/17/2019    CALCIUM 9.4 09/17/2019    ALBUMIN 4.50 09/17/2019    BILITOT 0.7 09/17/2019    ALKPHOS 113 09/17/2019    AST 42 (H) 09/17/2019    ALT 33 09/17/2019     Lipid Panel:  Lab Results   Component Value Date    CHOL 104 09/17/2019    TRIG 144 09/17/2019    HDL 42 09/17/2019    VLDL 28.8 09/17/2019    LDL 33 09/17/2019     HbA1c:  Lab Results   Component Value Date    HGBA1C 8.6 02/25/2020    HGBA1C 7.8 11/25/2019     Microalbumin:  Lab Results   Component Value Date    MICROALBUR 1.4 05/07/2019               Bryan was seen today for diabetes and shoulder pain.    Diagnoses and all orders for this visit:    Type 2 diabetes mellitus with diabetic polyneuropathy, without long-term current use of insulin (CMS/[facility])  -     POC Glycosylated Hemoglobin (Hb A1C)  -     POC Glucose  -     Urine Drug Screen - Urine, Clean Catch; Future  Uncontrolled and worsening.  Discussed with patient the need to start insulin.  He refused.  He promises to work on his diet.  Continue maximum dose of Ozempic, " Farxiga, metformin.  Continue gabapentin.  The treatment plan includes a controlled substance.  Controlled Substance Medication Agreement signed and on file.  Risks, benefits, and alternative treatments reviewed.  GUDELIA report has been reviewed and scanned into the patient’s chart.     Therapeutic drug monitoring  -     Urine Drug Screen - Urine, Clean Catch; Future    Arthritis of right acromioclavicular joint  Reviewed x-rays from 2016.  Continue low back but recommend increasing to daily use.  Primary osteoarthritis of knee, unspecified laterality  Reviewed x-rays from 2016.  Continue low back but recommend increasing to daily use.  Chronic fatigue  Likely related to his sleep apnea.  He has been prescribed Provigil elsewhere.  Recommend following up in sleep clinic.  Erectile dysfunction, unspecified erectile dysfunction type  Discussed with patient I would not prescribe Viagra with his heart disease.      Return in about 3 months (around 5/25/2020) for Office visit.    PAUL Lindquist Student    I saw and evaluated the patient. I discussed the case with the NP student and agree with the findings and plan as documented. I personally performed the Exam and Medical Decision Making. Critical elements of the physical exam and MDM are documented above.      Dr. Erika Landrum

## 2020-02-28 DIAGNOSIS — E11.42 TYPE 2 DIABETES MELLITUS WITH DIABETIC POLYNEUROPATHY, WITHOUT LONG-TERM CURRENT USE OF INSULIN (HCC): ICD-10-CM

## 2020-02-28 DIAGNOSIS — Z51.81 THERAPEUTIC DRUG MONITORING: ICD-10-CM

## 2020-03-02 DIAGNOSIS — F33.1 MAJOR DEPRESSIVE DISORDER, RECURRENT EPISODE, MODERATE (HCC): ICD-10-CM

## 2020-03-03 RX ORDER — ESCITALOPRAM OXALATE 10 MG/1
TABLET ORAL
Qty: 90 TABLET | Refills: 0 | Status: SHIPPED | OUTPATIENT
Start: 2020-03-03 | End: 2020-07-27

## 2020-03-07 ENCOUNTER — TELEPHONE (OUTPATIENT)
Dept: FAMILY MEDICINE CLINIC | Facility: CLINIC | Age: 65
End: 2020-03-07

## 2020-03-07 NOTE — TELEPHONE ENCOUNTER
Pt called regarding a medication denial. He stated that he went to the pharmacy and they said that his medications FARXIGA and OMEPRAZOLE were denied.     He woud like someone to take care of the situation and call him back. He is unsure why these medications are being denied.

## 2020-03-08 DIAGNOSIS — G47.10 HYPERSOMNIA WITH SLEEP APNEA: ICD-10-CM

## 2020-03-08 DIAGNOSIS — G47.30 HYPERSOMNIA WITH SLEEP APNEA: ICD-10-CM

## 2020-03-09 RX ORDER — MODAFINIL 200 MG/1
TABLET ORAL
Qty: 60 TABLET | Refills: 0 | Status: SHIPPED | OUTPATIENT
Start: 2020-03-09 | End: 2020-04-06

## 2020-03-09 NOTE — TELEPHONE ENCOUNTER
Spoke with the patients pharmacy who explained the patient just picked up a 90 day supply of farxiga and omeprazole less than 1 month ago so they are considered an early refill. I advised this to the patient who understood and did not have any more questions at this time.

## 2020-03-18 ENCOUNTER — PATIENT MESSAGE (OUTPATIENT)
Dept: CARDIOLOGY | Facility: CLINIC | Age: 65
End: 2020-03-18

## 2020-04-01 DIAGNOSIS — I10 ESSENTIAL HYPERTENSION: ICD-10-CM

## 2020-04-01 RX ORDER — AMLODIPINE BESYLATE 2.5 MG/1
TABLET ORAL
Qty: 90 TABLET | Refills: 0 | OUTPATIENT
Start: 2020-04-01

## 2020-04-01 NOTE — TELEPHONE ENCOUNTER
Spoke with patient and explained that his amlodipine was actually d/c by his cardiology doctor back in September which is why the refill was denied today. The patient then went on about his bill and was not sure why he was being  600$ for just a finger stick. I did however explain to him their should be a number on the form of the bill and he could call them to get a better understanding as to why they are sending him that bill and what its truly for. The patient verbalized understanding and did not have any further questions.

## 2020-04-01 NOTE — TELEPHONE ENCOUNTER
PT CALLED STATED THAT ON 02/25/2020 THERE WAS LAB WORK DONE ND IT WAS SENT TO A OUT OF NETWORK PROVIDER AND HE IS GETTING BILLED FOR IT WHEN IT SHOULD HAVE WENT TO A IN NETWORK PROVIDER.  ALSO RX AMLODIPINE WAS SENT TO PHARMACY BUT WAS DENIED, PT WOULD LIKE TO KNOW WHY IT WAS DENIED.    PLEASE ADVISE.  CALL BACK:[phone]       JUDY [address]

## 2020-04-06 DIAGNOSIS — G47.10 HYPERSOMNIA WITH SLEEP APNEA: ICD-10-CM

## 2020-04-06 DIAGNOSIS — G47.30 HYPERSOMNIA WITH SLEEP APNEA: ICD-10-CM

## 2020-04-06 RX ORDER — MODAFINIL 200 MG/1
TABLET ORAL
Qty: 60 TABLET | Refills: 0 | Status: SHIPPED | OUTPATIENT
Start: 2020-04-06

## 2020-04-18 DIAGNOSIS — E11.42 TYPE 2 DIABETES MELLITUS WITH DIABETIC POLYNEUROPATHY, WITHOUT LONG-TERM CURRENT USE OF INSULIN (HCC): ICD-10-CM

## 2020-04-20 RX ORDER — GABAPENTIN 300 MG/1
CAPSULE ORAL
Qty: 360 CAPSULE | Refills: 0 | Status: SHIPPED | OUTPATIENT
Start: 2020-04-20 | End: 2020-11-06 | Stop reason: SDUPTHER

## 2020-04-20 NOTE — TELEPHONE ENCOUNTER
Last fill: 1/23/20  Last office visit: 2/25/20  Next office visit: 5/26/20  Date of last Isaiah: 2/25/20  CSA up-to-date? yes  Date of last UDS: 2/25/20  UDS consistent: consistent

## 2020-05-01 DIAGNOSIS — I25.10 CORONARY ARTERY DISEASE INVOLVING NATIVE HEART WITHOUT ANGINA PECTORIS, UNSPECIFIED VESSEL OR LESION TYPE: ICD-10-CM

## 2020-05-01 DIAGNOSIS — E78.5 DYSLIPIDEMIA: ICD-10-CM

## 2020-05-01 DIAGNOSIS — E11.42 TYPE 2 DIABETES MELLITUS WITH DIABETIC POLYNEUROPATHY, WITHOUT LONG-TERM CURRENT USE OF INSULIN (HCC): ICD-10-CM

## 2020-05-01 RX ORDER — ATORVASTATIN CALCIUM 40 MG/1
40 TABLET, FILM COATED ORAL NIGHTLY
Qty: 90 TABLET | Refills: 1 | Status: SHIPPED | OUTPATIENT
Start: 2020-05-01

## 2020-05-01 NOTE — TELEPHONE ENCOUNTER
Patient called and stated that refills are needed for the following prescription(s):    atorvastatin (LIPITOR) 40 MG tablet    Pharmacy: [address]  PH: [phone]  FX: [phone]    Patient callback: [phone]    Please advise.

## 2020-05-07 DIAGNOSIS — K22.2 ESOPHAGEAL STRICTURE: ICD-10-CM

## 2020-05-07 DIAGNOSIS — K21.9 GASTROESOPHAGEAL REFLUX DISEASE, ESOPHAGITIS PRESENCE NOT SPECIFIED: ICD-10-CM

## 2020-05-07 DIAGNOSIS — E11.42 TYPE 2 DIABETES MELLITUS WITH DIABETIC POLYNEUROPATHY, WITHOUT LONG-TERM CURRENT USE OF INSULIN (HCC): ICD-10-CM

## 2020-05-07 RX ORDER — OMEPRAZOLE 40 MG/1
CAPSULE, DELAYED RELEASE ORAL
Qty: 90 CAPSULE | Refills: 1 | Status: SHIPPED | OUTPATIENT
Start: 2020-05-07 | End: 2020-11-06 | Stop reason: SDUPTHER

## 2020-05-07 RX ORDER — DAPAGLIFLOZIN 10 MG/1
TABLET, FILM COATED ORAL
Qty: 90 TABLET | Refills: 0 | Status: SHIPPED | OUTPATIENT
Start: 2020-05-07 | End: 2020-08-04

## 2020-06-22 NOTE — PROGRESS NOTES
Subjective:     Encounter Date:06/25/2020      Patient ID: Bryan Oswald is a 64 y.o.   white male from [place], [place], now living predominantly in [place], [place], .     REFERRING PROVIDER: PAUL Mueller  PHYSICIAN: Erika Hernandez MD  REMOTE ELECTROPHYSIOLOGIST: Joseph Buchanan MD  REMOTE CARDIOLOGIST: Santhosh Rosenbaum MD, [facility]  NEUROLOGIST/SLEEP PHYSICIAN: Cristian Doran MD, [facility]  REMOTE [facility] INTERVENTIONALIST: Jones Marroquin MD.    Chief Complaint:   Chief Complaint   Patient presents with   • Coronary Artery Disease     Problem List:  1. Atrial flutter:  a.  Unknown onset, noted incidentally at time of office visit, March 2014.   b. CHADS score of 2.   c. Status post ASYA-guided cardioversion; 03/11/2014: EF 0.50-0.55, no thrombus in the JASS, 175 J biphasic shock with conversion to sinus rhythm.  d. Recurrence of persistent atrial flutter, 10/18/2015.  e. Typical isthmus-dependent counterclockwise right atrial flutter with termination with radiofrequency ablation, 11/04/2015   f. Echocardiogram, 1/25/2017: Technically difficult parasternal views due to vertical images.  LV normal in size.  Normal LV wall thickness.  Visually estimated EF 0.65.  Abnormal systolic strain pattern.  Normal diastolic function.  No significant valvular heart disease  g. No documented recent recurrence  2. Chronic ischemic heart disease:  a. History of NSTEMI, March 2008.  b.  Status post proximal LAD stent, data deficit.    c. Taxus LALITO to OM-1 and OM-3 on 04/04/2008.  d. Left heart catheterization, 1/25/2017: Successful PCI of the obtuse marginal 1 in-stent restenosis with one drug-eluting stent.  Final kissing balloon inflation of the obtuse marginal artery as well as mid circumflex due to bifurcating lesion with good vessels.  Severe diffuse disease involving the small distal vessels of all epicardial arteries likely due to underlying diabetic status which is not good interventional  which should be managed medically.  PCI not feasible for the small distal arteries I think if there is a recurrence of proximal epicardial vessels in the future he may be considered for bypass but would definitely have poor distal targets.  Optimal medical therapy at this point in time  e. Residual class I symptoms with acceptable EKG, September 2019  3. Hypertension.   4. Dyslipidemia.   5. Type 2 diabetes mellitus; hemoglobin A1c 8.2%, August 2019 with peripheral neuropathy, 8.6% February 2020.  6. Obesity.   7. GERD.   8. Probable obstructive sleep apnea with chronic daytime sleepiness and recent abnormal outpatient nocturnal oximetry screening study.  9. History of gout.   10. Depression.  11. History of esophageal stricture.  12. Leg and hand fasciculations with nerve conduction testing, September 2019, demonstrating peripheral neuropathy, axonal, moderate nerve damage to left wrist and left elbow.  No ALS.  Felt that neuropathy secondary to diabetes  13. Short-term memory loss  14. Surgical history:   a. Status post appendectomy.   b. Status post renal stone removal and renal stent placement.  c. Carpal  tunnel release.  d. Tonsillectomy.  e. Flutter ablation  f. [facility]     No Known Allergies      Current Outpatient Medications:   •  aspirin 81 MG EC tablet, Take 1 tablet by mouth Daily., Disp: , Rfl:   •  atorvastatin (LIPITOR) 40 MG tablet, Take 1 tablet by mouth Every Night., Disp: 90 tablet, Rfl: 1  •  carvedilol (COREG) 12.5 MG tablet, Take 1 tablet by mouth 2 (Two) Times a Day With Meals., Disp: 180 tablet, Rfl: 3  •  cetirizine (zyrTEC) 10 MG tablet, Take 10 mg by mouth Daily., Disp: , Rfl:   •  escitalopram (LEXAPRO) 10 MG tablet, TAKE ONE TABLET BY MOUTH DAILY, Disp: 90 tablet, Rfl: 0  •  Evolocumab (REPATHA SURECLICK) 140 MG/ML solution auto-injector, Inject  under the skin., Disp: , Rfl:   •  FARXIGA 10 MG tablet, TAKE ONE TABLET BY MOUTH DAILY, Disp: 90 tablet, Rfl: 0  •  ferrous sulfate 325 (65 FE)  MG tablet, Take 325 mg by mouth., Disp: , Rfl:   •  gabapentin (NEURONTIN) 300 MG capsule, TAKE ONE CAPSULE BY MOUTH FOUR TIMES A DAY, Disp: 360 capsule, Rfl: 0  •  glucose blood test strip, Use to test blood sugars once daily., Disp: 50 each, Rfl: 11  •  lisinopril (PRINIVIL,ZESTRIL) 10 MG tablet, Take 1 tablet by mouth Daily., Disp: 90 tablet, Rfl: 1  •  Melatonin 5 MG capsule, Take 10 mg by mouth Daily., Disp: , Rfl:   •  meloxicam (MOBIC) 7.5 MG tablet, Take 1-2 tablets by mouth Daily As Needed for Moderate Pain ., Disp: 180 tablet, Rfl: 1  •  metFORMIN (GLUCOPHAGE) 1000 MG tablet, TAKE ONE TABLET BY MOUTH TWICE A DAY WITH MEALS, Disp: 180 tablet, Rfl: 0  •  modafinil (PROVIGIL) 200 MG tablet, TAKE ONE TABLET BY MOUTH TWICE A DAY (Patient taking differently: Take 200 mg by mouth As Needed. TAKE ONE TABLET BY MOUTH TWICE A DAY), Disp: 60 tablet, Rfl: 0  •  nitroglycerin (NITROSTAT) 0.4 MG SL tablet, Place 1 tablet under the tongue Every 5 (Five) Minutes As Needed for Chest Pain., Disp: 30 tablet, Rfl: 0  •  omeprazole (priLOSEC) 40 MG capsule, TAKE ONE CAPSULE BY MOUTH DAILY, Disp: 90 capsule, Rfl: 1  •  Semaglutide, 1 MG/DOSE, (OZEMPIC, 1 MG/DOSE,) 2 MG/1.5ML solution pen-injector, Inject 1 mg under the skin into the appropriate area as directed 1 (One) Time Per Week., Disp: 4 pen, Rfl: 5  •  Unable to find, Inject 1 each as directed 1 (One) Time. Med Name: research drug for low testosterone, Disp: , Rfl:     HISTORY OF PRESENT ILLNESS:  The patient is here after a 9-month hiatus.  After his last appointment he was to consume and discontinue Plavix and discontinue amlodipine.  His Coreg was altered to 12.5 mg twice daily.  The patient has not noticed any chest pressure, shortness of breath, palpitations, presyncope, syncope, or edema.  He has not done much activity since the quarantine.  He has lower extremity fasciculations and occasional cramping.  He has over-the-counter magnesium at home but has not used it  "on a routine basis.  The patient's A1c was 8.6% in February 2020.  He had nerve conduction testing, September 2019, demonstrating peripheral neuropathy, axonal, moderate nerve damage to left wrist and left elbow.  No ALS.  Felt that neuropathy secondary to diabetes.  The patient works at [employer] part-time 2 days a week.      Review of Systems   All other systems reviewed and are negative.     All other systems reviewed and otherwise negative.    Procedures       Objective:       Vitals:    06/25/20 1014   BP: 106/58   BP Location: Left arm   Patient Position: Sitting   Pulse: 90   SpO2: 96%   Weight: 96.6 kg (213 lb)   Height: 185.4 cm (73\")     Body mass index is 28.1 kg/m².  Last weight September 2019 was 212 pounds  Physical Exam   Constitutional: He is oriented to person, place, and time. He appears well-developed and well-nourished.   Neck: No JVD present. Carotid bruit is not present. No thyromegaly present.   Cardiovascular: Regular rhythm, S1 normal, S2 normal and normal heart sounds. Exam reveals no gallop, no S3 and no friction rub.   No murmur heard.  Pulses:       Dorsalis pedis pulses are 2+ on the right side, and 2+ on the left side.        Posterior tibial pulses are 2+ on the right side, and 2+ on the left side.   Pulmonary/Chest: Effort normal and breath sounds normal. He has no wheezes. He has no rhonchi. He has no rales.   Abdominal: Soft. He exhibits no mass. There is no hepatosplenomegaly. There is no tenderness. There is no guarding.   Bowel sounds audible x4   Musculoskeletal: Normal range of motion. He exhibits no edema.   Lymphadenopathy:     He has no cervical adenopathy.   Neurological: He is alert and oriented to person, place, and time.   Skin: Skin is warm, dry and intact. No rash noted.   Bilateral lower extremity fasciculations   Vitals reviewed.        Lab Review:   Lab Results   Component Value Date    GLUCOSE 135 (H) 09/17/2019    BUN 13 09/17/2019    CREATININE 0.96 09/17/2019    " EGFRIFNONA 79 09/17/2019    BCR 13.5 09/17/2019    CO2 25.3 09/17/2019    CALCIUM 9.4 09/17/2019    ALBUMIN 4.50 09/17/2019    AST 42 (H) 09/17/2019    ALT 33 09/17/2019       Lab Results   Component Value Date    WBC 8.56 05/07/2019    HGB 15.4 05/07/2019    HCT 47.9 05/07/2019    MCV 96.6 05/07/2019     05/07/2019       Lab Results   Component Value Date    HGBA1C 8.6 02/25/2020       Lab Results   Component Value Date    TSH 1.190 05/07/2019       Lab Results   Component Value Date    CHOL 104 09/17/2019    CHOL 111 05/07/2019     Lab Results   Component Value Date    TRIG 144 09/17/2019    TRIG 155 (H) 05/07/2019     Lab Results   Component Value Date    HDL 42 09/17/2019    HDL 48 05/07/2019     Lab Results   Component Value Date    LDL 33 09/17/2019    LDL 32 05/07/2019       Lab Results   Component Value Date    BNP 64 03/11/2014     Advance Care Planning   ACP discussion was held with the patient during this visit. Patient does not have an advance directive, declines further assistance.      Assessment:     Overall continued acceptable course with no new interim cardiopulmonary complaints with acceptable functional status. We will defer additional diagnostic or therapeutic intervention from a cardiac perspective at this time.  I encouraged the patient to use his over-the-counter magnesium for his muscle cramps/spasms.     Diagnosis Plan   1. Atrial flutter, unspecified type (CMS/[facility])   No recurrent episodes   2. Coronary artery disease involving native heart without angina pectoris, unspecified vessel or lesion type  No recurrent angina pectoris or CHF on current activity schedule; continue current treatment   3. Essential hypertension   Controlled, continue current cardiac medications   4. Type 2 diabetes mellitus with diabetic polyneuropathy, without long-term current use of insulin (CMS/[facility])   Hemoglobin A1c 8.6% February 2020, encouraged patient to increase physical activity as tolerated and heart  healthy diet   5. Fasciculations   OTC magnesium   6. Dyslipidemia   Acceptable lipid panel September 2019, continue Repatha          Plan:         1. Patient to continue current medications and close follow up with the above providers.  2. Tentative cardiology follow up in December 2020 or patient may return sooner PRN.  3. Encouraged patient to use over-the-counter magnesium for his muscle cramps/spasms      Electronically signed by PAUL Brady, 06/25/20, 12:12 PM.

## 2020-06-25 ENCOUNTER — OFFICE VISIT (OUTPATIENT)
Dept: CARDIOLOGY | Facility: CLINIC | Age: 65
End: 2020-06-25

## 2020-06-25 VITALS
HEART RATE: 90 BPM | BODY MASS INDEX: 28.23 KG/M2 | HEIGHT: 73 IN | DIASTOLIC BLOOD PRESSURE: 58 MMHG | SYSTOLIC BLOOD PRESSURE: 106 MMHG | WEIGHT: 213 LBS | OXYGEN SATURATION: 96 %

## 2020-06-25 DIAGNOSIS — E11.42 TYPE 2 DIABETES MELLITUS WITH DIABETIC POLYNEUROPATHY, WITHOUT LONG-TERM CURRENT USE OF INSULIN (HCC): ICD-10-CM

## 2020-06-25 DIAGNOSIS — I48.92 ATRIAL FLUTTER, UNSPECIFIED TYPE (HCC): Primary | ICD-10-CM

## 2020-06-25 DIAGNOSIS — E78.5 DYSLIPIDEMIA: ICD-10-CM

## 2020-06-25 DIAGNOSIS — I10 ESSENTIAL HYPERTENSION: ICD-10-CM

## 2020-06-25 DIAGNOSIS — I25.10 CORONARY ARTERY DISEASE INVOLVING NATIVE HEART WITHOUT ANGINA PECTORIS, UNSPECIFIED VESSEL OR LESION TYPE: ICD-10-CM

## 2020-06-25 DIAGNOSIS — R25.3 FASCICULATIONS: ICD-10-CM

## 2020-06-25 PROCEDURE — 99214 OFFICE O/P EST MOD 30 MIN: CPT | Performed by: NURSE PRACTITIONER

## 2020-07-08 DIAGNOSIS — E11.42 TYPE 2 DIABETES MELLITUS WITH DIABETIC POLYNEUROPATHY, WITHOUT LONG-TERM CURRENT USE OF INSULIN (HCC): ICD-10-CM

## 2020-07-08 DIAGNOSIS — I10 ESSENTIAL HYPERTENSION: ICD-10-CM

## 2020-07-08 RX ORDER — LISINOPRIL 10 MG/1
TABLET ORAL
Qty: 90 TABLET | Refills: 0 | Status: SHIPPED | OUTPATIENT
Start: 2020-07-08 | End: 2020-11-06 | Stop reason: SDUPTHER

## 2020-07-18 DIAGNOSIS — E11.42 TYPE 2 DIABETES MELLITUS WITH DIABETIC POLYNEUROPATHY, WITHOUT LONG-TERM CURRENT USE OF INSULIN (HCC): ICD-10-CM

## 2020-07-20 RX ORDER — GABAPENTIN 300 MG/1
CAPSULE ORAL
Qty: 360 CAPSULE | Refills: 0 | OUTPATIENT
Start: 2020-07-20

## 2020-07-20 NOTE — TELEPHONE ENCOUNTER
Patient was last seen in the office 2/25/2020.  He needs to be seen every 3 months for controlled substance refill.  Schedule office visit so that we can also check A1c for his diabetes.  If he declines office visit then schedule a MyChart video visit.  No refill until appointment.

## 2020-07-20 NOTE — TELEPHONE ENCOUNTER
Last fill: 4/20/20  Last office visit: 2/25/20  Next office visit: 9/21/20  Date of last Isaiah: Today  CSA up-to-date? 11/25/19  Date of last UDS: 2/25/20  UDS consistent: consistent

## 2020-07-27 DIAGNOSIS — F33.1 MAJOR DEPRESSIVE DISORDER, RECURRENT EPISODE, MODERATE (HCC): ICD-10-CM

## 2020-07-27 RX ORDER — ESCITALOPRAM OXALATE 10 MG/1
TABLET ORAL
Qty: 90 TABLET | Refills: 0 | Status: SHIPPED | OUTPATIENT
Start: 2020-07-27 | End: 2020-11-06

## 2020-08-04 DIAGNOSIS — E11.42 TYPE 2 DIABETES MELLITUS WITH DIABETIC POLYNEUROPATHY, WITHOUT LONG-TERM CURRENT USE OF INSULIN (HCC): ICD-10-CM

## 2020-08-04 RX ORDER — DAPAGLIFLOZIN 10 MG/1
TABLET, FILM COATED ORAL
Qty: 90 TABLET | Refills: 0 | Status: SHIPPED | OUTPATIENT
Start: 2020-08-04 | End: 2020-09-08

## 2020-08-21 RX ORDER — SEMAGLUTIDE 1.34 MG/ML
INJECTION, SOLUTION SUBCUTANEOUS
Qty: 3 PEN | Refills: 4 | Status: SHIPPED | OUTPATIENT
Start: 2020-08-21 | End: 2020-11-06 | Stop reason: SDUPTHER

## 2020-09-05 DIAGNOSIS — E11.42 TYPE 2 DIABETES MELLITUS WITH DIABETIC POLYNEUROPATHY, WITHOUT LONG-TERM CURRENT USE OF INSULIN (HCC): ICD-10-CM

## 2020-09-05 DIAGNOSIS — I10 ESSENTIAL HYPERTENSION: ICD-10-CM

## 2020-09-05 DIAGNOSIS — I25.10 CORONARY ARTERY DISEASE INVOLVING NATIVE HEART WITHOUT ANGINA PECTORIS, UNSPECIFIED VESSEL OR LESION TYPE: ICD-10-CM

## 2020-09-08 RX ORDER — DAPAGLIFLOZIN 10 MG/1
TABLET, FILM COATED ORAL
Qty: 30 TABLET | Refills: 0 | Status: SHIPPED | OUTPATIENT
Start: 2020-09-08 | End: 2020-11-06 | Stop reason: SDUPTHER

## 2020-09-08 RX ORDER — CARVEDILOL 12.5 MG/1
TABLET ORAL
Qty: 180 TABLET | Refills: 2 | Status: SHIPPED | OUTPATIENT
Start: 2020-09-08

## 2020-09-30 ENCOUNTER — TELEPHONE (OUTPATIENT)
Dept: FAMILY MEDICINE CLINIC | Facility: CLINIC | Age: 65
End: 2020-09-30

## 2020-10-01 NOTE — TELEPHONE ENCOUNTER
Spoke with Bryan, he is a part time worker stocking things. 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 He said that his back and legs are hurting him after this shift. He stated that if he was allowed to sit down and do this he would be able to perform those functions but his employer will not allow him to sit down.

## 2020-11-05 DIAGNOSIS — K21.9 GASTROESOPHAGEAL REFLUX DISEASE: ICD-10-CM

## 2020-11-05 DIAGNOSIS — K22.2 ESOPHAGEAL STRICTURE: ICD-10-CM

## 2020-11-06 ENCOUNTER — OFFICE VISIT (OUTPATIENT)
Dept: FAMILY MEDICINE CLINIC | Facility: CLINIC | Age: 65
End: 2020-11-06

## 2020-11-06 VITALS
OXYGEN SATURATION: 99 % | DIASTOLIC BLOOD PRESSURE: 70 MMHG | HEIGHT: 73 IN | BODY MASS INDEX: 28.52 KG/M2 | HEART RATE: 82 BPM | SYSTOLIC BLOOD PRESSURE: 120 MMHG | WEIGHT: 215.2 LBS | TEMPERATURE: 97.3 F

## 2020-11-06 DIAGNOSIS — E61.1 IRON DEFICIENCY: ICD-10-CM

## 2020-11-06 DIAGNOSIS — E78.5 DYSLIPIDEMIA: ICD-10-CM

## 2020-11-06 DIAGNOSIS — Z00.00 WELL ADULT EXAM: Primary | ICD-10-CM

## 2020-11-06 DIAGNOSIS — K22.2 ESOPHAGEAL STRICTURE: ICD-10-CM

## 2020-11-06 DIAGNOSIS — R25.3 FASCICULATIONS: ICD-10-CM

## 2020-11-06 DIAGNOSIS — K21.9 GASTROESOPHAGEAL REFLUX DISEASE: ICD-10-CM

## 2020-11-06 DIAGNOSIS — I10 ESSENTIAL HYPERTENSION: ICD-10-CM

## 2020-11-06 DIAGNOSIS — E11.42 TYPE 2 DIABETES MELLITUS WITH DIABETIC POLYNEUROPATHY, WITHOUT LONG-TERM CURRENT USE OF INSULIN (HCC): ICD-10-CM

## 2020-11-06 DIAGNOSIS — F33.1 MODERATE EPISODE OF RECURRENT MAJOR DEPRESSIVE DISORDER (HCC): ICD-10-CM

## 2020-11-06 LAB — HBA1C MFR BLD: 9.9 %

## 2020-11-06 PROCEDURE — G0438 PPPS, INITIAL VISIT: HCPCS | Performed by: FAMILY MEDICINE

## 2020-11-06 PROCEDURE — 83036 HEMOGLOBIN GLYCOSYLATED A1C: CPT | Performed by: FAMILY MEDICINE

## 2020-11-06 RX ORDER — FERROUS SULFATE 325(65) MG
325 TABLET ORAL
COMMUNITY

## 2020-11-06 RX ORDER — OMEPRAZOLE 40 MG/1
40 CAPSULE, DELAYED RELEASE ORAL DAILY
Qty: 90 CAPSULE | Refills: 1 | Status: SHIPPED | OUTPATIENT
Start: 2020-11-06

## 2020-11-06 RX ORDER — GABAPENTIN 300 MG/1
300 CAPSULE ORAL 4 TIMES DAILY
Qty: 360 CAPSULE | Refills: 0 | Status: SHIPPED | OUTPATIENT
Start: 2020-11-06

## 2020-11-06 RX ORDER — DULOXETIN HYDROCHLORIDE 30 MG/1
30 CAPSULE, DELAYED RELEASE ORAL DAILY
Qty: 90 CAPSULE | Refills: 0 | Status: SHIPPED | OUTPATIENT
Start: 2020-11-06 | End: 2021-02-05

## 2020-11-06 RX ORDER — SEMAGLUTIDE 1.34 MG/ML
1 INJECTION, SOLUTION SUBCUTANEOUS
Qty: 3 PEN | Refills: 4 | Status: SHIPPED | OUTPATIENT
Start: 2020-11-06

## 2020-11-06 RX ORDER — LISINOPRIL 10 MG/1
10 TABLET ORAL DAILY
Qty: 90 TABLET | Refills: 1 | Status: SHIPPED | OUTPATIENT
Start: 2020-11-06

## 2020-11-06 RX ORDER — DAPAGLIFLOZIN 10 MG/1
1 TABLET, FILM COATED ORAL DAILY
Qty: 90 TABLET | Refills: 1 | Status: SHIPPED | OUTPATIENT
Start: 2020-11-06

## 2020-11-06 RX ORDER — OMEPRAZOLE 40 MG/1
CAPSULE, DELAYED RELEASE ORAL
Qty: 90 CAPSULE | Refills: 0 | OUTPATIENT
Start: 2020-11-06

## 2020-11-06 RX ORDER — MULTIPLE VITAMINS W/ MINERALS TAB 9MG-400MCG
1 TAB ORAL DAILY
COMMUNITY

## 2020-11-06 NOTE — PATIENT INSTRUCTIONS
Fall Prevention in the Home, Adult  Falls can cause injuries and can affect people from all age groups. There are many simple things that you can do to make your home safe and to help prevent falls. Ask for help when making these changes, if needed.  What actions can I take to prevent falls?  General instructions  · Use good lighting in all rooms. Replace any light bulbs that burn out.  · Turn on lights if it is dark. Use night-lights.  · Place frequently used items in easy-to-reach places. Lower the shelves around your home if necessary.  · Set up furniture so that there are clear paths around it. Avoid moving your furniture around.  · Remove throw rugs and other tripping hazards from the floor.  · Avoid walking on wet floors.  · Fix any uneven floor surfaces.  · Add color or contrast paint or tape to grab bars and handrails in your home. Place contrasting color strips on the first and last steps of stairways.  · When you use a stepladder, make sure that it is completely opened and that the sides are firmly locked. Have someone hold the ladder while you are using it. Do not climb a closed stepladder.  · Be aware of any and all pets.  What can I do in the bathroom?         · Keep the floor dry. Immediately clean up any water that spills onto the floor.  · Remove soap buildup in the tub or shower on a regular basis.  · Use non-skid mats or decals on the floor of the tub or shower.  · Attach bath mats securely with double-sided, non-slip rug tape.  · If you need to sit down while you are in the shower, use a plastic, non-slip stool.  · Install grab bars by the toilet and in the tub and shower. Do not use towel bars as grab bars.  What can I do in the bedroom?  · Make sure that a bedside light is easy to reach.  · Do not use oversized bedding that drapes onto the floor.  · Have a firm chair that has side arms to use for getting dressed.  What can I do in the kitchen?  · Clean up any spills right away.  · If you  need to reach for something above you, use a sturdy step stool that has a grab bar.  · Keep electrical cables out of the way.  · Do not use floor polish or wax that makes floors slippery. If you must use wax, make sure that it is non-skid floor wax.  What can I do in the stairways?  · Do not leave any items on the stairs.  · Make sure that you have a light switch at the top of the stairs and the bottom of the stairs. Have them installed if you do not have them.  · Make sure that there are handrails on both sides of the stairs. Fix handrails that are broken or loose. Make sure that handrails are as long as the stairways.  · Install non-slip stair treads on all stairs in your home.  · Avoid having throw rugs at the top or bottom of stairways, or secure the rugs with carpet tape to prevent them from moving.  · Choose a carpet design that does not hide the edge of steps on the stairway.  · Check any carpeting to make sure that it is firmly attached to the stairs. Fix any carpet that is loose or worn.  What can I do on the outside of my home?  · Use bright outdoor lighting.  · Regularly repair the edges of walkways and driveways and fix any cracks.  · Remove high doorway thresholds.  · Trim any shrubbery on the main path into your home.  · Regularly check that handrails are securely fastened and in good repair. Both sides of any steps should have handrails.  · Install guardrails along the edges of any raised decks or porches.  · Clear walkways of debris and clutter, including tools and rocks.  · Have leaves, snow, and ice cleared regularly.  · Use sand or salt on walkways during winter months.  · In the garage, clean up any spills right away, including grease or oil spills.  What other actions can I take?  · Wear closed-toe shoes that fit well and support your feet. Wear shoes that have rubber soles or low heels.  · Use mobility aids as needed, such as canes, walkers, scooters, and crutches.  · Review your medicines with  your health care provider. Some medicines can cause dizziness or changes in blood pressure, which increase your risk of falling.  Talk with your health care provider about other ways that you can decrease your risk of falls. This may include working with a physical therapist or  to improve your strength, balance, and endurance.  Where to find more information  · Centers for Disease Control and Prevention, STEADI: https://www.cdc.gov  · [place] on Aging: https://jd8effp.luis.nih.gov  Contact a health care provider if:  · You are afraid of falling at home.  · You feel weak, drowsy, or dizzy at home.  · You fall at home.  Summary  · There are many simple things that you can do to make your home safe and to help prevent falls.  · Ways to make your home safe include removing tripping hazards and installing grab bars in the bathroom.  · Ask for help when making these changes in your home.  This information is not intended to replace advice given to you by your health care provider. Make sure you discuss any questions you have with your health care provider.  Document Released: 2003 Document Revised: 2018 Document Reviewed: 2018  [employer] Patient Education ©  [employer]      Medicare Wellness  Personal Prevention Plan of Service     Date of Office Visit:  2020  Encounter Provider:  Erika Landrum MD  Place of Service:  [facility]  Patient Name: Bryan Oswald  :  1955    As part of the Medicare Wellness portion of your visit today, we are providing you with this personalized preventive plan of services (PPPS). This plan is based upon recommendations of the [place] Preventive Services Task Force (USPSTF) and the Advisory Committee on Immunization Practices (ACIP).    This lists the preventive care services that should be considered, and provides dates of when you are due. Items listed as completed are up-to-date and do  not require any further intervention.    Health Maintenance   Topic Date Due   • DIABETIC FOOT EXAM  05/07/2020   • URINE MICROALBUMIN  05/07/2020   • COLONOSCOPY  05/31/2020   • LIPID PANEL  09/17/2020   • DIABETIC EYE EXAM  12/31/2020   • HEMOGLOBIN A1C  02/06/2021   • ANNUAL WELLNESS VISIT  11/06/2021   • TDAP/TD VACCINES (3 - Td) 11/27/2029   • HEPATITIS C SCREENING  Completed   • INFLUENZA VACCINE  Completed   • Pneumococcal Vaccine 65+  Completed   • ZOSTER VACCINE  Completed       Orders Placed This Encounter   Procedures   • Comprehensive Metabolic Panel     Standing Status:   Future     Standing Expiration Date:   11/6/2021   • Lipid Panel     Standing Status:   Future     Standing Expiration Date:   11/6/2021   • TSH Rfx On Abnormal To Free T4     Standing Status:   Future     Standing Expiration Date:   11/6/2021   • Ambulatory Referral to Neurology     Referral Priority:   Routine     Referral Type:   Consultation     Referral Reason:   Specialty Services Required     Requested Specialty:   Neurology     Number of Visits Requested:   1   • POC Glycosylated Hemoglobin (Hb A1C)   • CBC & Differential     Standing Status:   Future     Standing Expiration Date:   11/6/2021     Order Specific Question:   Manual Differential     Answer:   No       Return in about 3 months (around 2/6/2021) for Office visit diabetes, Medicare Wellness 1 year.

## 2020-11-06 NOTE — PROGRESS NOTES
The ABCs of the Annual Wellness Visit  Initial Medicare Wellness Visit    Chief Complaint   Patient presents with   • Medicare Wellness-Initial Visit       Subjective   History of Present Illness:  Bryan Oswald is a 65 y.o. male who presents for an Initial Medicare Wellness Visit.    He's changing eye doctors. Working 2 days a week at [employer], average 6 miles. He took 6 months off, went back 2-3 months ago. He took NTG when he felt a twinge, got dizzy, and was on the floor. He had a major case of diarrhea afterwards. All of a sudden he gets urge to go, a couple times he hasn't made it. Right shoulder pain, saw chiropractor yesterday felt better. He was never told why his legs are twitching. He doesn't think twitching is as bad. He's out of lisinopril. He has low energy. He tried cutting back dosage of gabapentin and didn't help. He gets depressed, tried lexapro but its not helping.     HEALTH RISK ASSESSMENT    Recent Hospitalizations:  No hospitalization(s) within the last year.    Current Medical Providers:  Patient Care Team:  Erika Vargas MD as PCP - General (Family Medicine)  Otilio Farnsworth MD as Consulting Physician (Cardiology)    Smoking Status:  Social History     Tobacco Use   Smoking Status Never Smoker   Smokeless Tobacco Never Used       Alcohol Consumption:  Social History     Substance and Sexual Activity   Alcohol Use Yes   • Alcohol/week: 2.0 standard drinks   • Types: 2 Cans of beer per week    Comment: occ       Depression Screen:   PHQ-2/PHQ-9 Depression Screening 11/6/2020   Little interest or pleasure in doing things 0   Feeling down, depressed, or hopeless 0   Trouble falling or staying asleep, or sleeping too much -   Feeling tired or having little energy -   Poor appetite or overeating -   Feeling bad about yourself - or that you are a failure or have let yourself or your family down -   Trouble concentrating on things, such as reading the newspaper or watching television -    Moving or speaking so slowly that other people could have noticed. Or the opposite - being so fidgety or restless that you have been moving around a lot more than usual -   Thoughts that you would be better off dead, or of hurting yourself in some way -   Total Score 0   If you checked off any problems, how difficult have these problems made it for you to do your work, take care of things at home, or get along with other people? -       Fall Risk Screen:  [facility], and patient is at MODERATE risk for falls. Assessment completed on:11/6/2020   STESYLVIA Fall Risk Clinician Key Questions   Have you fallen in the past year?: Yes    Stay Idependant Patient Questions   Patient Fall Risk Assessment Score : 0        Health Habits and Functional and Cognitive Screening:  Functional & Cognitive Status 11/6/2020   Do you have difficulty preparing food and eating? No   Do you have difficulty bathing yourself, getting dressed or grooming yourself? No   Do you have difficulty using the toilet? No   Do you have difficulty moving around from place to place? No   Do you have trouble with steps or getting out of a bed or a chair? No   Current Diet Limited Junk Food   Dental Exam Up to date   Eye Exam Up to date   Exercise (times per week) 2 times per week   Current Exercise Activities Include Walking   Do you need help using the phone?  No   Are you deaf or do you have serious difficulty hearing?  No   Do you need help with transportation? No   Do you need help shopping? No   Do you need help preparing meals?  No   Do you need help with housework?  No   Do you need help with laundry? No   Do you need help taking your medications? No   Do you need help managing money? No   Do you ever drive or ride in a car without wearing a seat belt? Yes   Have you felt unusual stress, anger or loneliness in the last month? No   Who do you live with? Alone   If you need help, do you have trouble finding someone  available to you? No   Have you been bothered in the last four weeks by sexual problems? Yes   Do you have difficulty concentrating, remembering or making decisions? -         Does the patient have evidence of cognitive impairment? No    Asprin use counseling:Taking ASA appropriately as indicated    Age-appropriate Screening Schedule:  Refer to the list below for future screening recommendations based on patient's age, sex and/or medical conditions. Orders for these recommended tests are listed in the plan section. The patient has been provided with a written plan.    Health Maintenance   Topic Date Due   • DIABETIC FOOT EXAM  05/07/2020   • URINE MICROALBUMIN  05/07/2020   • COLONOSCOPY  05/31/2020   • LIPID PANEL  09/17/2020   • DIABETIC EYE EXAM  12/31/2020   • HEMOGLOBIN A1C  02/06/2021   • TDAP/TD VACCINES (3 - Td) 11/27/2029   • INFLUENZA VACCINE  Completed   • ZOSTER VACCINE  Completed          The following portions of the patient's history were reviewed and updated as appropriate: allergies, current medications, past family history, past medical history, past social history, past surgical history and problem list.    Outpatient Medications Prior to Visit   Medication Sig Dispense Refill   • aspirin 81 MG EC tablet Take 1 tablet by mouth Daily.     • atorvastatin (LIPITOR) 40 MG tablet Take 1 tablet by mouth Every Night. 90 tablet 1   • carvedilol (COREG) 12.5 MG tablet TAKE ONE TABLET BY MOUTH TWICE A DAY WITH MEALS 180 tablet 2   • cetirizine (zyrTEC) 10 MG tablet Take 10 mg by mouth Daily.     • Evolocumab (REPATHA SURECLICK) 140 MG/ML solution auto-injector Inject  under the skin.     • ferrous sulfate 325 (65 FE) MG tablet Take 325 mg by mouth.     • ferrous sulfate 325 (65 FE) MG tablet Take 325 mg by mouth Daily With Breakfast.     • Melatonin 5 MG capsule Take 10 mg by mouth Daily.     • multivitamin with minerals tablet tablet Take 1 tablet by mouth Daily.     • nitroglycerin (NITROSTAT) 0.4 MG SL  tablet Place 1 tablet under the tongue Every 5 (Five) Minutes As Needed for Chest Pain. 30 tablet 0   • escitalopram (LEXAPRO) 10 MG tablet TAKE ONE TABLET BY MOUTH DAILY 90 tablet 0   • FARXIGA 10 MG tablet TAKE ONE TABLET BY MOUTH DAILY 30 tablet 0   • gabapentin (NEURONTIN) 300 MG capsule TAKE ONE CAPSULE BY MOUTH FOUR TIMES A  capsule 0   • metFORMIN (GLUCOPHAGE) 1000 MG tablet TAKE ONE TABLET BY MOUTH TWICE A DAY WITH MEALS 180 tablet 0   • omeprazole (priLOSEC) 40 MG capsule TAKE ONE CAPSULE BY MOUTH DAILY 90 capsule 1   • OZEMPIC, 1 MG/DOSE, 2 MG/1.5ML solution pen-injector DIAL AND INJECT SUBCUTANEOUSLY 1 MG WEEKLY 3 pen 4   • glucose blood test strip Use to test blood sugars once daily. 50 each 11   • meloxicam (MOBIC) 7.5 MG tablet Take 1-2 tablets by mouth Daily As Needed for Moderate Pain . 180 tablet 1   • modafinil (PROVIGIL) 200 MG tablet TAKE ONE TABLET BY MOUTH TWICE A DAY (Patient taking differently: Take 200 mg by mouth As Needed. TAKE ONE TABLET BY MOUTH TWICE A DAY) 60 tablet 0   • lisinopril (PRINIVIL,ZESTRIL) 10 MG tablet TAKE ONE TABLET BY MOUTH DAILY 90 tablet 0   • Unable to find Inject 1 each as directed 1 (One) Time. Med Name: research drug for low testosterone       No facility-administered medications prior to visit.        Patient Active Problem List   Diagnosis   • Atrial flutter (CMS/HCC)   • Coronary artery disease   • HTN (hypertension)   • Dyslipidemia   • Type 2 diabetes mellitus with diabetic polyneuropathy (CMS/HCC)   • Obesity due to excess calories   • GERD (gastroesophageal reflux disease)   • History of gout   • Depression   • Anemia   • Migraine headache   • Esophageal stricture   • Hx of adenomatous colonic polyps   • Iron deficiency   • Elevated liver enzymes   • Fasciculations   • Excessive daytime sleepiness   • History of radiofrequency ablation (RFA) procedure for cardiac arrhythmia   • Snoring   • KIM (obstructive sleep apnea)   • Moderate episode of  "recurrent major depressive disorder (CMS/[facility])   • Spinal osteoarthritis   • Controlled substance agreement signed   • Arthritis of right acromioclavicular joint   • Erectile dysfunction       Advanced Care Planning:  ACP discussion was held with the patient during this visit. Patient does not have an advance directive, information provided.    Review of Systems   Constitutional: Positive for fatigue.   Neurological:        Memory       Compared to one year ago, the patient feels his physical health is worse.  Compared to one year ago, the patient feels his mental health is the same.    Reviewed chart for potential of high risk medication in the elderly: yes Gabapentin for neuropathy  Reviewed chart for potential of harmful drug interactions in the elderly:not applicable    Objective         Vitals:    11/06/20 1055   BP: 120/70   Pulse: 82   Temp: 97.3 °F (36.3 °C)   SpO2: 99%   Weight: 97.6 kg (215 lb 3.2 oz)   Height: 185.4 cm (72.99\")   PainSc: 0-No pain       Body mass index is 28.4 kg/m².  Discussed the patient's BMI with him. The BMI is above average; BMI management plan is completed.    Physical Exam  Constitutional:       General: He is not in acute distress.  HENT:      Right Ear: Tympanic membrane and ear canal normal.      Left Ear: Tympanic membrane and ear canal normal.   Eyes:      General:         Right eye: No discharge.         Left eye: No discharge.      Conjunctiva/sclera: Conjunctivae normal.      Comments: Wears glasses   Neck:      Musculoskeletal: Neck supple.      Thyroid: No thyromegaly.   Cardiovascular:      Rate and Rhythm: Normal rate and regular rhythm.      Heart sounds: Normal heart sounds. No murmur.   Pulmonary:      Effort: Pulmonary effort is normal.      Breath sounds: Normal breath sounds.   Abdominal:      General: Bowel sounds are normal.      Palpations: Abdomen is soft.      Tenderness: There is no abdominal tenderness.   Musculoskeletal:      Right lower leg: No edema.     "  Left lower leg: No edema.   Lymphadenopathy:      Head:      Right side of head: No submandibular, preauricular or posterior auricular adenopathy.      Left side of head: No submandibular, preauricular or posterior auricular adenopathy.      Cervical: No cervical adenopathy.   Skin:     General: Skin is warm and dry.   Neurological:      Mental Status: He is alert and oriented to person, place, and time.      Comments: Fasciculations right calf   Psychiatric:         Mood and Affect: Mood is depressed. Affect is labile and inappropriate.         Speech: Speech normal.         Thought Content: Thought content normal.         Judgment: Judgment normal.         Lab Results   Component Value Date    HGBA1C 9.9 11/06/2020        Assessment/Plan   Medicare Risks and Personalized Health Plan  CMS Preventative Services Quick Reference  Advance Directive Discussion  Chronic Pain   Colon Cancer Screening  Depression/Dysphoria  Hearing Problem  Immunizations Discussed/Encouraged (specific immunizations; Influenza )  Obesity/Overweight     Patient's Body mass index is 28.4 kg/m². BMI is above normal parameters. Recommendations include: nutrition counseling.    The above risks/problems have been discussed with the patient.  Pertinent information has been shared with the patient in the After Visit Summary.  Follow up plans and orders are seen below in the Assessment/Plan Section.    Diagnoses and all orders for this visit:    1. Well adult exam (Primary)  Patient nonfasting today advised him to return to the lab in 1 week.  Immunizations are up-to-date.  His last colonoscopy in 2019 had an adequate prep and needs further screening.  2. Type 2 diabetes mellitus with diabetic polyneuropathy, without long-term current use of insulin (CMS/[facility])  -     POC Glycosylated Hemoglobin (Hb A1C)  -     Ambulatory Referral for Diabetic Eye Exam-Ophthalmology  -     lisinopril (PRINIVIL,ZESTRIL) 10 MG tablet; Take 1 tablet by mouth Daily.   Dispense: 90 tablet; Refill: 1  -     metFORMIN (GLUCOPHAGE) 1000 MG tablet; Take 1 tablet by mouth 2 (Two) Times a Day With Meals.  Dispense: 180 tablet; Refill: 1  -     gabapentin (NEURONTIN) 300 MG capsule; Take 1 capsule by mouth 4 (Four) Times a Day.  Dispense: 360 capsule; Refill: 0  -     Dapagliflozin Propanediol (Farxiga) 10 MG tablet; Take 10 mg by mouth Daily.  Dispense: 90 tablet; Refill: 1  -     Semaglutide, 1 MG/DOSE, (Ozempic, 1 MG/DOSE,) 2 MG/1.5ML solution pen-injector; Inject 1 mg under the skin into the appropriate area as directed Every 7 (Seven) Days.  Dispense: 3 pen; Refill: 4  -     Comprehensive Metabolic Panel; Future  -     Lipid Panel; Future  Severely uncontrolled.  Advise use of insulin but patient refuses.  At this time maximize treatment with metformin, GLP-1 a, SGLT2.  Avoid sulfonylurea at his age.  Restart gabapentin.  Isaiah report reviewed.  3. Fasciculations  -     Ambulatory Referral to Neurology  Refer back to neurology.  Reviewed previous EMG and nerve conduction study results with patient in detail.  4. Gastroesophageal reflux disease  -     omeprazole (priLOSEC) 40 MG capsule; Take 1 capsule by mouth Daily.  Dispense: 90 capsule; Refill: 1  Continue PPI.  5. Esophageal stricture  -     omeprazole (priLOSEC) 40 MG capsule; Take 1 capsule by mouth Daily.  Dispense: 90 capsule; Refill: 1  Continue PPI.  6. Essential hypertension  -     lisinopril (PRINIVIL,ZESTRIL) 10 MG tablet; Take 1 tablet by mouth Daily.  Dispense: 90 tablet; Refill: 1  -     Comprehensive Metabolic Panel; Future  -     Lipid Panel; Future  -     TSH Rfx On Abnormal To Free T4; Future  Continue current medications  7. Moderate episode of recurrent major depressive disorder (CMS/HCC)  -     DULoxetine (CYMBALTA) 30 MG capsule; Take 1 capsule by mouth Daily.  Dispense: 90 capsule; Refill: 0  No improvement with Lexapro.  Switch to Cymbalta.  Reassess at next visit.  8. Iron deficiency  -     CBC &  Differential; Future    9. Dyslipidemia  -     Comprehensive Metabolic Panel; Future  -     Lipid Panel; Future  Lifetime statin.    Follow Up:  Return in about 3 months (around 2/6/2021) for Office visit diabetes, Medicare Wellness 1 year.     An After Visit Summary and PPPS were given to the patient.

## 2020-11-11 ENCOUNTER — PRIOR AUTHORIZATION (OUTPATIENT)
Dept: FAMILY MEDICINE CLINIC | Facility: CLINIC | Age: 65
End: 2020-11-11

## 2020-11-11 NOTE — TELEPHONE ENCOUNTER
Bryan Oswald (Phillips: [id number]) - [id number]  Ozempic (1 MG/DOSE) 2MG/1.5ML pen-injectors  Status: PA Response - Approved  Created: November 6th, 2020 [phone]  Sent: November 11th, 2020    PA Case: [id number], Status: Approved, Coverage Starts on: 11/11/2020 12:00:00 AM, Coverage Ends on: 11/11/2021 12:00:00 AM.

## 2020-11-12 ENCOUNTER — LAB (OUTPATIENT)
Dept: LAB | Facility: HOSPITAL | Age: 65
End: 2020-11-12

## 2020-11-12 DIAGNOSIS — E61.1 IRON DEFICIENCY: ICD-10-CM

## 2020-11-12 DIAGNOSIS — I10 ESSENTIAL HYPERTENSION: ICD-10-CM

## 2020-11-12 DIAGNOSIS — E78.5 DYSLIPIDEMIA: ICD-10-CM

## 2020-11-12 DIAGNOSIS — E11.42 TYPE 2 DIABETES MELLITUS WITH DIABETIC POLYNEUROPATHY, WITHOUT LONG-TERM CURRENT USE OF INSULIN (HCC): ICD-10-CM

## 2020-11-12 LAB
ALBUMIN SERPL-MCNC: 4.6 G/DL (ref 3.5–5.2)
ALBUMIN/GLOB SERPL: 1.5 G/DL
ALP SERPL-CCNC: 168 U/L (ref 39–117)
ALT SERPL W P-5'-P-CCNC: 50 U/L (ref 1–41)
ANION GAP SERPL CALCULATED.3IONS-SCNC: 11.4 MMOL/L (ref 5–15)
AST SERPL-CCNC: 53 U/L (ref 1–40)
BASOPHILS # BLD AUTO: 0.06 10*3/MM3 (ref 0–0.2)
BASOPHILS NFR BLD AUTO: 1 % (ref 0–1.5)
BILIRUB SERPL-MCNC: 0.9 MG/DL (ref 0–1.2)
BUN SERPL-MCNC: 17 MG/DL (ref 8–23)
BUN/CREAT SERPL: 18.1 (ref 7–25)
CALCIUM SPEC-SCNC: 9.4 MG/DL (ref 8.6–10.5)
CHLORIDE SERPL-SCNC: 98 MMOL/L (ref 98–107)
CHOLEST SERPL-MCNC: 109 MG/DL (ref 0–200)
CO2 SERPL-SCNC: 26.6 MMOL/L (ref 22–29)
CREAT SERPL-MCNC: 0.94 MG/DL (ref 0.76–1.27)
DEPRECATED RDW RBC AUTO: 40.2 FL (ref 37–54)
EOSINOPHIL # BLD AUTO: 0.15 10*3/MM3 (ref 0–0.4)
EOSINOPHIL NFR BLD AUTO: 2.5 % (ref 0.3–6.2)
ERYTHROCYTE [DISTWIDTH] IN BLOOD BY AUTOMATED COUNT: 12.1 % (ref 12.3–15.4)
GFR SERPL CREATININE-BSD FRML MDRD: 80 ML/MIN/1.73
GLOBULIN UR ELPH-MCNC: 3 GM/DL
GLUCOSE SERPL-MCNC: 209 MG/DL (ref 65–99)
HCT VFR BLD AUTO: 46.7 % (ref 37.5–51)
HDLC SERPL-MCNC: 42 MG/DL (ref 40–60)
HGB BLD-MCNC: 15.6 G/DL (ref 13–17.7)
IMM GRANULOCYTES # BLD AUTO: 0.02 10*3/MM3 (ref 0–0.05)
IMM GRANULOCYTES NFR BLD AUTO: 0.3 % (ref 0–0.5)
LDLC SERPL CALC-MCNC: 36 MG/DL (ref 0–100)
LDLC/HDLC SERPL: 0.68 {RATIO}
LYMPHOCYTES # BLD AUTO: 1.44 10*3/MM3 (ref 0.7–3.1)
LYMPHOCYTES NFR BLD AUTO: 24 % (ref 19.6–45.3)
MCH RBC QN AUTO: 30.2 PG (ref 26.6–33)
MCHC RBC AUTO-ENTMCNC: 33.4 G/DL (ref 31.5–35.7)
MCV RBC AUTO: 90.5 FL (ref 79–97)
MONOCYTES # BLD AUTO: 0.45 10*3/MM3 (ref 0.1–0.9)
MONOCYTES NFR BLD AUTO: 7.5 % (ref 5–12)
NEUTROPHILS NFR BLD AUTO: 3.89 10*3/MM3 (ref 1.7–7)
NEUTROPHILS NFR BLD AUTO: 64.7 % (ref 42.7–76)
NRBC BLD AUTO-RTO: 0 /100 WBC (ref 0–0.2)
PLATELET # BLD AUTO: 131 10*3/MM3 (ref 140–450)
PMV BLD AUTO: 11.1 FL (ref 6–12)
POTASSIUM SERPL-SCNC: 4.5 MMOL/L (ref 3.5–5.2)
PROT SERPL-MCNC: 7.6 G/DL (ref 6–8.5)
RBC # BLD AUTO: 5.16 10*6/MM3 (ref 4.14–5.8)
SODIUM SERPL-SCNC: 136 MMOL/L (ref 136–145)
TRIGL SERPL-MCNC: 192 MG/DL (ref 0–150)
TSH SERPL DL<=0.05 MIU/L-ACNC: 1.04 UIU/ML (ref 0.27–4.2)
VLDLC SERPL-MCNC: 31 MG/DL (ref 5–40)
WBC # BLD AUTO: 6.01 10*3/MM3 (ref 3.4–10.8)

## 2020-11-12 PROCEDURE — 80061 LIPID PANEL: CPT

## 2020-11-12 PROCEDURE — 84443 ASSAY THYROID STIM HORMONE: CPT

## 2020-11-12 PROCEDURE — 85025 COMPLETE CBC W/AUTO DIFF WBC: CPT

## 2020-11-12 PROCEDURE — 80053 COMPREHEN METABOLIC PANEL: CPT

## 2021-01-31 DIAGNOSIS — E11.42 TYPE 2 DIABETES MELLITUS WITH DIABETIC POLYNEUROPATHY, WITHOUT LONG-TERM CURRENT USE OF INSULIN (HCC): ICD-10-CM

## 2021-02-01 RX ORDER — GABAPENTIN 300 MG/1
CAPSULE ORAL
Qty: 360 CAPSULE | Refills: 0 | OUTPATIENT
Start: 2021-02-01

## 2021-02-01 NOTE — TELEPHONE ENCOUNTER
Last fill: 11/6/20  Last office visit: 11/6/20  Next office visit: 2/5/21  CSA up-to-date? no  Date of last UDS: 2/25/20  UDS consistent: consistent

## 2021-02-01 NOTE — TELEPHONE ENCOUNTER
Patient's 90-day supply was sent on November 6, 2020 and it will not be due until February 6.  I will send the refill at his appointment.

## 2021-02-04 DIAGNOSIS — F33.1 MODERATE EPISODE OF RECURRENT MAJOR DEPRESSIVE DISORDER (HCC): ICD-10-CM

## 2021-02-05 RX ORDER — DULOXETIN HYDROCHLORIDE 30 MG/1
CAPSULE, DELAYED RELEASE ORAL
Qty: 30 CAPSULE | Refills: 0 | Status: SHIPPED | OUTPATIENT
Start: 2021-02-05

## 2021-02-05 NOTE — TELEPHONE ENCOUNTER
Patient is due for his follow-up office visit and diabetes.  I will send a 30-day refill.  Please schedule patient.

## 2021-03-31 ENCOUNTER — TELEPHONE (OUTPATIENT)
Dept: FAMILY MEDICINE CLINIC | Facility: CLINIC | Age: 66
End: 2021-03-31

## 2021-03-31 NOTE — TELEPHONE ENCOUNTER
Please call patient to schedule a follow-up office visit with me.  I need to check his diabetes, neuropathy, and depression.

## 2021-03-31 NOTE — TELEPHONE ENCOUNTER
Attempted to contact patient, no answer. Left voicemail for patient to call the office back and schedule an appt (office # given).     Hub may relay message and schedule appointment.

## 2021-03-31 NOTE — TELEPHONE ENCOUNTER
Attempted to contact, no answer LVM asking to return our call to schedule an upcoming appt     Hub can relay and schedule 3 month follow up

## 2021-11-12 PROBLEM — F32.A DEPRESSION: Status: RESOLVED | Noted: 2017-11-28 | Resolved: 2021-11-12
